# Patient Record
Sex: MALE | Race: ASIAN | NOT HISPANIC OR LATINO | Employment: FULL TIME | URBAN - METROPOLITAN AREA
[De-identification: names, ages, dates, MRNs, and addresses within clinical notes are randomized per-mention and may not be internally consistent; named-entity substitution may affect disease eponyms.]

---

## 2017-05-01 ENCOUNTER — OFFICE VISIT (OUTPATIENT)
Dept: FAMILY MEDICINE | Facility: CLINIC | Age: 27
End: 2017-05-01

## 2017-05-01 VITALS
BODY MASS INDEX: 29.55 KG/M2 | HEIGHT: 68 IN | TEMPERATURE: 98.2 F | RESPIRATION RATE: 16 BRPM | SYSTOLIC BLOOD PRESSURE: 138 MMHG | OXYGEN SATURATION: 98 % | HEART RATE: 89 BPM | WEIGHT: 195 LBS | DIASTOLIC BLOOD PRESSURE: 87 MMHG

## 2017-05-01 DIAGNOSIS — J06.9 VIRAL URI WITH COUGH: ICD-10-CM

## 2017-05-01 DIAGNOSIS — J45.30 REACTIVE AIRWAY DISEASE WITH WHEEZING, MILD PERSISTENT, UNCOMPLICATED: Primary | ICD-10-CM

## 2017-05-01 RX ORDER — ALBUTEROL SULFATE 90 UG/1
2 AEROSOL, METERED RESPIRATORY (INHALATION) EVERY 4 HOURS PRN
Qty: 1 INHALER | Refills: 3 | Status: SHIPPED | OUTPATIENT
Start: 2017-05-01 | End: 2017-09-18

## 2017-05-01 RX ORDER — FLUTICASONE PROPIONATE 110 UG/1
1-2 AEROSOL, METERED RESPIRATORY (INHALATION) 2 TIMES DAILY
Qty: 3 INHALER | Refills: 3 | Status: SHIPPED | OUTPATIENT
Start: 2017-05-01 | End: 2018-05-22

## 2017-05-01 NOTE — PATIENT INSTRUCTIONS
Flovent - inhale 1-2 puffs twice a day.     Albuterol inhaler - use when needed. No more than twice a week.      Your medication list is printed, please keep this with you, it is helpful to bring this current list to any other medical appointments, the emergency room or hospital.    If you had lab testing today and your results are reassuring or normal they will be be mailed to you within 7 days.     If the lab tests need quick action we will call you with the results.   The phone number we will call with results is # [phone] (home) . If this is not the best number please call our clinic and change the number.    If you need any refills please call your pharmacy and they will contact us.    If you have any further concerns or wish to schedule another appointment you must call our office during normal business hours  [phone] (8-5:00 M-F)  If you have urgent medical questions that cannot wait  you may also call [phone] at any time of day.  If you have a medical emergency please call [phone].    Thank you for coming to [facility].

## 2017-05-01 NOTE — MR AVS SNAPSHOT
After Visit Summary   5/1/2017    Sukhdev Price    MRN: [id number]           Patient Information     Date Of Birth          1990        Visit Information        Provider Department      5/1/2017 3:00 PM Fei Downing MD [facility]        Today's Diagnoses     Reactive airway disease with wheezing, mild persistent, uncomplicated    -  1    Viral URI with cough          Care Instructions    Flovent - inhale 1-2 puffs twice a day.     Albuterol inhaler - use when needed. No more than twice a week.      Your medication list is printed, please keep this with you, it is helpful to bring this current list to any other medical appointments, the emergency room or hospital.    If you had lab testing today and your results are reassuring or normal they will be be mailed to you within 7 days.     If the lab tests need quick action we will call you with the results.   The phone number we will call with results is # [phone] (home) . If this is not the best number please call our clinic and change the number.    If you need any refills please call your pharmacy and they will contact us.    If you have any further concerns or wish to schedule another appointment you must call our office during normal business hours  [phone] (8-5:00 M-F)  If you have urgent medical questions that cannot wait  you may also call [phone] at any time of day.  If you have a medical emergency please call [phone].    Thank you for coming to [facility].          Follow-ups after your visit        Who to contact     Please call your clinic at [phone] to:    Ask questions about your health    Make or cancel appointments    Discuss your medicines    Learn about your test results    Speak to your doctor   If you have compliments or concerns about an experience at your clinic, or if you wish to file a complaint, please contact [facility] Patient Relations at [phone] or email us  "at [email or web address]         Additional Information About Your Visit        [employer] [facility] Information     Tapulous is an electronic gateway that provides easy, online access to your medical records. With Tapulous, you can request a clinic appointment, read your test results, renew a prescription or communicate with your care team.     To sign up for Tapulous visit the website at [email or web address]   You will be asked to enter the access code listed below, as well as some personal information. Please follow the directions to create your username and password.     Your access code is: [email or web address]  Expires: 2017  3:20 PM     Your access code will  in 90 days. If you need help or a new code, please contact your [facility] or call [phone] for assistance.        Care EveryWhere ID     This is your Care EveryWhere ID. This could be used by other organizations to access your [place] medical records  [id number]        Your Vitals Were     Pulse Temperature Respirations Height Pulse Oximetry BMI (Body Mass Index)    89 98.2  F (36.8  C) (Oral) 16 5' 7.5\" (171.5 cm) 98% 30.09 kg/m2       Blood Pressure from Last 3 Encounters:   17 138/87   10/03/16 139/83   01/22/15 131/78    Weight from Last 3 Encounters:   17 195 lb (88.5 kg)   10/03/16 189 lb 6.4 oz (85.9 kg)   01/22/15 185 lb 12.8 oz (84.3 kg)              Today, you had the following     No orders found for display         Today's Medication Changes          These changes are accurate as of: 17  3:20 PM.  If you have any questions, ask your nurse or doctor.               Start taking these medicines.        Dose/Directions    fluticasone 110 MCG/ACT Inhaler   Commonly known as:  FLOVENT HFA   Used for:  Reactive airway disease with wheezing, mild persistent, uncomplicated   Started by:  Fei Downing MD        Dose:  1-2 puff   Inhale 1-2 puffs into the lungs 2 times daily   Quantity: "  3 Inhaler   Refills:  3         Stop taking these medicines if you haven't already. Please contact your care team if you have questions.     benzonatate 200 MG capsule   Commonly known as:  TESSALON   Stopped by:  Fei Downing MD           guaiFENesin-codeine 100-10 MG/5ML Soln solution   Commonly known as:  ROBITUSSIN AC   Stopped by:  Fei Downing MD                Where to get your medicines      These medications were sent to [id number] - [place], [place] - [address] AT [address]  [address], [place] [address]     Phone:  [phone]     albuterol 108 (90 BASE) MCG/ACT Inhaler    fluticasone 110 MCG/ACT Inhaler                Primary Care Provider Office Phone # Fax #    Lelia Cesia Andres [phone]       [facility] [address] [place] [address]        Thank you!     Thank you for choosing [facility]  for your care. Our goal is always to provide you with excellent care. Hearing back from our patients is one way we can continue to improve our services. Please take a few minutes to complete the written survey that you may receive in the mail after your visit with us. Thank you!             Your Updated Medication List - Protect others around you: Learn how to safely use, store and throw away your medicines at www.disposemymeds.org.          This list is accurate as of: 5/1/17  3:20 PM.  Always use your most recent med list.                   Brand Name Dispense Instructions for use    albuterol 108 (90 BASE) MCG/ACT Inhaler    PROAIR HFA/PROVENTIL HFA/VENTOLIN HFA    1 Inhaler    Inhale 2 puffs into the lungs every 4 hours as needed for shortness of breath / dyspnea or wheezing       fluticasone 110 MCG/ACT Inhaler    FLOVENT HFA    3 Inhaler    Inhale 1-2 puffs into the lungs 2 times daily

## 2017-05-01 NOTE — PROGRESS NOTES
"       HPI:       Sukhdev Price is a 27 year old  male who presents for wheezing.  Patient was seen last year for wheezing at night. Also notes itchy eyes, and some eye irritation. No sore throat. No snoring. Wakes him up at night, and also wakes up his wife.  He was begun last year on albuterol that helped a lot, but he ran out.  No choking. No evidence for reflux. Patient's wife believesthis may be due to a miek headboard.  No known allergies. Does not ever bother him during the day. Able to exercise without difficulty, and often goes to the Gym.  No onset of problems when running. No wheezing at any other time.  Does admit to an allergy of dogs.             PMHX:   Current Medications:   Current Outpatient Prescriptions   Medication Sig Dispense Refill     albuterol (PROAIR HFA, PROVENTIL HFA, VENTOLIN HFA) 108 (90 BASE) MCG/ACT inhaler Inhale 2 puffs into the lungs every 4 hours as needed for shortness of breath / dyspnea or wheezing 1 Inhaler 0       Existing Problems  Patient Active Problem List   Diagnosis     Viral URI with cough       Allergies:  Allergies   Allergen Reactions     Nka [No Known Allergies]        Previous labs:  No results found for: WBC, HGB, HCT, PLT, CHOL, TRIG, HDL, ALT, AST, NA, CREATININE, BUN, CO2, TSH, PSA, INR, GLUF            Review of Systems:    CONSTITUTIONAL: no fatigue, no unexpected change in weight  SKIN: no worrisome rashes, no worrisome moles, no worrisome lesions  EYES: no acute vision problems or changes  ENT: no ear problems, no mouth problems, no throat problems  RESP: no significant cough, no shortness of breath  CV: no chest pain, no palpitations, no new or worsening peripheral edema  GI: no nausea, no vomiting, no constipation, no diarrhea          Physical Exam:     Vitals:    05/01/17 1458   BP: 138/87   Pulse: 89   Resp: 16   Temp: 98.2  F (36.8  C)   TempSrc: Oral   SpO2: 98%   Weight: 195 lb (88.5 kg)   Height: 5' 7.5\" (171.5 cm)     Body mass index is 30.09 " kg/(m^2).    GENERAL:alert, well hydrated, no distress  EYES: Eyes grossly normal to inspection, extraocular movements - intact, and PERRL  HENT: ear canals- normal; TMs- normal; Nose- normal; Mouth- no ulcers, no lesions  NECK: no tenderness, no adenopathy, no asymmetry, no masses, no stiffness; thyroid- normal to palpation  RESP: lungs clear to auscultation - no rales, no rhonchi, no wheezes  CV: regular rates and rhythm, normal S1 S2, no S3 or S4 and no murmur, no click or rub -  ABDOMEN: soft, no tenderness, no  hepatosplenomegaly, no masses, normal bowel sounds             Labs and Procedures     Historic Results on 03/30/2011   Component Date Value Ref Range Status     [place] Results 03/30/2011 SEE BELOW   Final    Comment:                                                                          HI          Expected                           Test                           Result        LO  Units   Values                           -----------------------------------------------------------------                           Semen Analysis with WHO Morphology                             Abstinence                   2                 d                                                                        Collection Site              Outside                                                                                    Study Type                   Semen                                                                                      Container Type               50 mL [place]                                                                               Appearance                   YEL                                                                                        Semen Volume                 4.5               mL      >=1.5                                                            pH                           8.5                       >=7.2                                                             Motile/mL                    23.5              x10(6)  >=6.0                                                            Sperm/mL                     43.5              x10(6)  >=15.0                                                           Motility                     54                %       >=40                                                             Grade                        3                         >=2.5                                                            Motile/Ejaculate             105.8             x10(6)  >=9.0                                                            Viscosity                    4                         >=3.0                                                            Agglutination                4                         >=3.0                                                            Comment                                                                                       Specimen appearance, volume, pH and viscosity were observations by                               referring personnel.                                                         WHO Morph NL                 25                %       >=15                                                             Other                        75                %                                                                        Germ Cells/mL                0.00              (x10)6  <4.00                                                            WBC/mL                       0.44              (x10)6  <1.00                                                          Test performed or referred by [facility]                           [address] [place] [address]                           : Dallas Pickens III, M.D.     Slides, Sperm Ct 03/30/2011 NO   Final              Assessment and Plan     1.Suspect reactive airway disease - probably secondary to environmental allergy.   No evidence for asthma at this time.    2. Fluticasone inhaler - bid, but may just take at night if that is when he has problems.    3. Albuterol as rescue medication.    4. Discussed environmental issues, and need to avoid agents likely to cause problems.   5. Return if problems persist or further problems develops.         Options for treatment and follow-up care were reviewed with the patient and/or guardian. Xai Dee and/or guardian engaged in the decision making process and verbalized understanding of the options discussed and agreed with the final plan.    Fei Downing MD

## 2017-06-16 ENCOUNTER — OFFICE VISIT (OUTPATIENT)
Dept: FAMILY MEDICINE | Facility: CLINIC | Age: 27
End: 2017-06-16

## 2017-06-16 VITALS
RESPIRATION RATE: 16 BRPM | BODY MASS INDEX: 29.04 KG/M2 | OXYGEN SATURATION: 97 % | DIASTOLIC BLOOD PRESSURE: 80 MMHG | HEIGHT: 68 IN | HEART RATE: 92 BPM | SYSTOLIC BLOOD PRESSURE: 131 MMHG | TEMPERATURE: 98.1 F | WEIGHT: 191.6 LBS

## 2017-06-16 DIAGNOSIS — S93.412A SPRAIN OF CALCANEOFIBULAR LIGAMENT OF LEFT ANKLE, INITIAL ENCOUNTER: Primary | ICD-10-CM

## 2017-06-16 NOTE — LETTER
RETURN TO WORK/SCHOOL FORM    6/16/2017    Re: Sukhdev Price  1990      To Whom It May Concern:     Sukhdev Price was seen in clinic today. He may return to work without restrictions on Monday 6/19/17.          Restrictions:  None      Ria Castellanos DO  6/16/2017 3:51 PM

## 2017-06-16 NOTE — MR AVS SNAPSHOT
After Visit Summary   6/16/2017    Sukhdev Price    MRN: [id number]           Patient Information     Date Of Birth          1990        Visit Information        Provider Department      6/16/2017 3:40 PM Ria Castellanos DO [facility]        Today's Diagnoses     Sprain of left ankle, unspecified ligament, initial encounter    -  1      Care Instructions    Continue to take Tylenol and ibuprofen for pain.    Icing and elevating your left foot will help with the swelling.    Ace wrap your ankle if you are going to be on your foot (Mid foot to slightly above your ankle.    Swelling will be there for a couple more weeks, but should be getting better.        Ankle Sprain (Adult)  An ankle sprain is a stretching or tearing of the ligaments that hold the ankle joint together. There are no broken bones.  An ankle sprain is a common injury for both children and adults. It happens when the ankle turns, twists, or rolls in an awkward way. This can be caused by a sports injury. Or it can happen from doing something as simple as stepping on an uneven surface.  Ligaments are made of tough connective tissue. Normally, ligaments stretch a certain amount and then go back to their normal place. A sprain happens when a ligament is forced to stretch more than the normal amount. A severe sprain can actually tear the ligaments. If you have a severe sprain, you may have felt or heard something like a pop when you were injured.  Ankle sprains are given a grade depending on whether they are mild, moderate, or severe:    Grade 1 sprain. A mild sprain with minor stretching and damage to the ligament.    Grade 2 sprain. A moderate sprain where the ligament is partly torn.    Grade 3 sprain. The most severe kind of sprain. The ligament is completely torn.  Most sprains take about 4 to 6 weeks to heal. A severe sprain can take several months to recover.  Your healthcare provider may order X-rays to be sure you don t have  a fracture, or broken bone.  The injured area will feel sore.  Swelling and pain may make it hard to walk. You may need crutches if walking is painful. Or your provider may have you use a cast boot or air splint. This will depend on the grade of ankle sprain that you have.    Home care    For a Grade 1 sprain, use RICE (rest, ice, compression, and elevation):    Rest your ankle. Don t walk on it.    Ice should be used right away to help control swelling. Place an ice pack over the injured area for 20 minutes. Do this every 3 to 6 hours for the first 24 to 48 hours. Keep using ice packs to ease pain and swelling as needed. To make an ice pack, put ice cubes in a plastic bag that seals at the top. Wrap the bag in a clean, thin towel or cloth. Never put ice or an ice pack directly on the skin. The ice pack can be put right on the cast, bandage, or splint. As the ice melts, be careful that the cast, bandage, or splint doesn t get wet. If you have a boot, open it to apply an ice pack, unless told otherwise by your provider.    Compression devices help to control swelling. They also keep the ankle from moving and support your injured ankle. These devices include dressings, bandages, and wraps.    Elevate or raise your ankle above the level of your heart when sitting or lying down. This is very important for the first 48 hours.    Follow the RICE guidelines for a Grade 2 sprain. This type of sprain will take longer to heal. Your provider may have you wear a splint, cast, or brace to keep your ankle from moving.     If you have a Grade 3 sprain, you are at risk for long-term ankle instability. In rare cases, surgery may be needed. Your provider may have you wear a short leg cast or a walking boot for 2 to 3 weeks.    After 48 hours, it may be helpful to apply heat for 20 minutes several times a day. You can do this with a heating pad or warm compress. Or you may want to go back and forth between using ice and heat. Never  apply heat directly to the skin. Always wrap the heating pad or warm compress in a clean, thin towel or cloth.    You may use over-the-counter pain medicine (NSAIDS or nonsteroidal anti-inflammatory drugs) to control pain, unless another pain medicine was prescribed. Talk with your provider before using these medicines if you have chronic liver or kidney disease, or have ever had a stomach ulcer or GI (gastrointestinal) bleeding.    Follow any rehabilitation exercises your provider gives you. These can help you be more flexible and improve your balance and coordination. This is helpful in preventing long-term ankle problems.  Prevention  To help prevent ankle sprains, it s important to have good strength, balance, and flexibility. Be sure to:    Always warm up before you exercise or do something very active    Be careful when walking or running on uneven or cracked surfaces    Wear shoes that are in good condition and fit well    Listen to your body s signals to slow down when you are in pain or tired  Follow-up care  Any X-rays you had today don t show any broken bones, breaks, or fractures. Sometimes fractures don t show up on the first X-ray. Bruises and sprains can sometimes hurt as much as a fracture. These injuries can take time to heal completely. If your symptoms don t get better or they get worse, talk with your healthcare provider. You may need a repeat X-ray.  Follow up with your healthcare provider, or as advised. Check for any warning signs listed below.  When to seek medical advice  Call your healthcare provider right away if any of these occur:    Fever of 100.4 F (38 C) or higher, or as directed by your healthcare provider    The injury doesn t seem to be healing    The swelling comes back    The cast has a bad smell    The plaster cast or splint gets wet or soft    The fiberglass cast or splint gets wet and does not dry for 24 hours    The pain or swelling increases, or redness appears    Your toes  become cold, blue, numb, or tingly    The skin is discolored (looks blue, purple, or gray), has blisters, or is irritated    You re-injure your ankle  Date Last Reviewed: 11/20/2015 2000-2017 [employer]. [address] [place] [address]. All rights reserved. This information is not intended as a substitute for professional medical care. Always follow your healthcare professional's instructions.          Your medication list is printed, please keep this with you, it is helpful to bring this current list to any other medical appointments, the emergency room or hospital.    If you had lab testing today and your results are reassuring or normal they will be be mailed to you within 7 days.     If the lab tests need quick action we will call you with the results.   The phone number we will call with results is # [phone] (home) . If this is not the best number please call our clinic and change the number.    If you need any refills please call your pharmacy and they will contact us.    If you have any further concerns or wish to schedule another appointment you must call our office during normal business hours  [phone] (8-5:00 M-F)  If you have urgent medical questions that cannot wait  you may also call [phone] at any time of day.  If you have a medical emergency please call [phone].    Thank you for coming to [facility].            Follow-ups after your visit        Who to contact     Please call your clinic at [phone] to:    Ask questions about your health    Make or cancel appointments    Discuss your medicines    Learn about your test results    Speak to your doctor   If you have compliments or concerns about an experience at your clinic, or if you wish to file a complaint, please contact [facility] Relations at [phone] or email us at [email or web address]         Additional Information About Your Visit       "  MyChart Information     [employer] is an electronic gateway that provides easy, online access to your medical records. With [employer], you can request a clinic appointment, read your test results, renew a prescription or communicate with your care team.     To sign up for [employer] visit the website at [email or web address]   You will be asked to enter the access code listed below, as well as some personal information. Please follow the directions to create your username and password.     Your access code is: [email or web address]  Expires: 2017  3:20 PM     Your access code will  in 90 days. If you need help or a new code, please contact your [facility] or call [phone] for assistance.        Care EveryWhere ID     This is your Care EveryWhere ID. This could be used by other organizations to access your [facility] medical records  [id number]        Your Vitals Were     Pulse Temperature Respirations Height Pulse Oximetry BMI (Body Mass Index)    92 98.1  F (36.7  C) (Oral) 16 5' 7.5\" (171.5 cm) 97% 29.57 kg/m2       Blood Pressure from Last 3 Encounters:   17 131/80   17 138/87   10/03/16 139/83    Weight from Last 3 Encounters:   17 191 lb 9.6 oz (86.9 kg)   17 195 lb (88.5 kg)   10/03/16 189 lb 6.4 oz (85.9 kg)              Today, you had the following     No orders found for display       Primary Care Provider Office Phone # Fax #    Lelia Andres [phone]       [facility] [address] [facility] [address]        Thank you!     Thank you for choosing [facility]  for your care. Our goal is always to provide you with excellent care. Hearing back from our patients is one way we can continue to improve our services. Please take a few minutes to complete the written survey that you may receive in the mail after your visit with us. Thank you!             Your Updated Medication List - Protect others around " you: Learn how to safely use, store and throw away your medicines at www.disposemymeds.org.          This list is accurate as of: 6/16/17  3:53 PM.  Always use your most recent med list.                   Brand Name Dispense Instructions for use    albuterol 108 (90 BASE) MCG/ACT Inhaler    PROAIR HFA/PROVENTIL HFA/VENTOLIN HFA    1 Inhaler    Inhale 2 puffs into the lungs every 4 hours as needed for shortness of breath / dyspnea or wheezing       fluticasone 110 MCG/ACT Inhaler    FLOVENT HFA    3 Inhaler    Inhale 1-2 puffs into the lungs 2 times daily

## 2017-06-16 NOTE — PROGRESS NOTES
"  Cc: left ankle pain         HPI:       Sukhdev Price is a 27 year old  male without a significant past medical history who presents for the new concern(s) of    1. Left ankle pain:  Injury to left ankle, jumped up and fell on person, foot turned inward, had immediate pain, heard a crack, has been walking on it, has been icing, tylenol, has injured that ankle before, he is a  and walks about 2 miles per day         PMHX:     Patient Active Problem List   Diagnosis     Viral URI with cough       Current Outpatient Prescriptions   Medication Sig Dispense Refill     albuterol (PROAIR HFA/PROVENTIL HFA/VENTOLIN HFA) 108 (90 BASE) MCG/ACT Inhaler Inhale 2 puffs into the lungs every 4 hours as needed for shortness of breath / dyspnea or wheezing 1 Inhaler 3     fluticasone (FLOVENT HFA) 110 MCG/ACT Inhaler Inhale 1-2 puffs into the lungs 2 times daily 3 Inhaler 3              Allergies   Allergen Reactions     Nka [No Known Allergies]        No results found for this or any previous visit (from the past 24 hour(s)).           Physical Exam:     Vitals:    06/16/17 1537   BP: 131/80   Pulse: 92   Resp: 16   Temp: 98.1  F (36.7  C)   TempSrc: Oral   SpO2: 97%   Weight: 191 lb 9.6 oz (86.9 kg)   Height: 5' 7.5\" (171.5 cm)     Body mass index is 29.57 kg/(m^2).    GENERAL APPEARANCE: healthy, alert and no distress,  MS: Left ankle, swollen surrounding the left malleolus , minimal bruising, good range of motion, nontender to palpation at malleolus  Walking stiffed knee and placing all weight on foot    Assessment and Plan     1. Sprain of calcaneofibular ligament of left ankle, initial encounter  - Discussed etiology of a sprain, ice, elevate, rest, use ACE wrap when going to be active  - Letter for work, he is , OK to return to work on Monday, but will need Ibuprofen and ice        Options for treatment and follow-up care were reviewed with the patient and/or guardian. Sukhdev Price and/or guardian engaged in " the decision making process and verbalized understanding of the options discussed and agreed with the final plan.    Ria Castellanos, DO

## 2017-06-16 NOTE — PATIENT INSTRUCTIONS
Continue to take Tylenol and ibuprofen for pain.    Icing and elevating your left foot will help with the swelling.    Ace wrap your ankle if you are going to be on your foot (Mid foot to slightly above your ankle.    Swelling will be there for a couple more weeks, but should be getting better.        Ankle Sprain (Adult)  An ankle sprain is a stretching or tearing of the ligaments that hold the ankle joint together. There are no broken bones.  An ankle sprain is a common injury for both children and adults. It happens when the ankle turns, twists, or rolls in an awkward way. This can be caused by a sports injury. Or it can happen from doing something as simple as stepping on an uneven surface.  Ligaments are made of tough connective tissue. Normally, ligaments stretch a certain amount and then go back to their normal place. A sprain happens when a ligament is forced to stretch more than the normal amount. A severe sprain can actually tear the ligaments. If you have a severe sprain, you may have felt or heard something like a pop when you were injured.  Ankle sprains are given a grade depending on whether they are mild, moderate, or severe:    Grade 1 sprain. A mild sprain with minor stretching and damage to the ligament.    Grade 2 sprain. A moderate sprain where the ligament is partly torn.    Grade 3 sprain. The most severe kind of sprain. The ligament is completely torn.  Most sprains take about 4 to 6 weeks to heal. A severe sprain can take several months to recover.  Your healthcare provider may order X-rays to be sure you don t have a fracture, or broken bone.  The injured area will feel sore.  Swelling and pain may make it hard to walk. You may need crutches if walking is painful. Or your provider may have you use a cast boot or air splint. This will depend on the grade of ankle sprain that you have.    Home care    For a Grade 1 sprain, use RICE (rest, ice, compression, and elevation):    Rest your ankle.  Don t walk on it.    Ice should be used right away to help control swelling. Place an ice pack over the injured area for 20 minutes. Do this every 3 to 6 hours for the first 24 to 48 hours. Keep using ice packs to ease pain and swelling as needed. To make an ice pack, put ice cubes in a plastic bag that seals at the top. Wrap the bag in a clean, thin towel or cloth. Never put ice or an ice pack directly on the skin. The ice pack can be put right on the cast, bandage, or splint. As the ice melts, be careful that the cast, bandage, or splint doesn t get wet. If you have a boot, open it to apply an ice pack, unless told otherwise by your provider.    Compression devices help to control swelling. They also keep the ankle from moving and support your injured ankle. These devices include dressings, bandages, and wraps.    Elevate or raise your ankle above the level of your heart when sitting or lying down. This is very important for the first 48 hours.    Follow the RICE guidelines for a Grade 2 sprain. This type of sprain will take longer to heal. Your provider may have you wear a splint, cast, or brace to keep your ankle from moving.     If you have a Grade 3 sprain, you are at risk for long-term ankle instability. In rare cases, surgery may be needed. Your provider may have you wear a short leg cast or a walking boot for 2 to 3 weeks.    After 48 hours, it may be helpful to apply heat for 20 minutes several times a day. You can do this with a heating pad or warm compress. Or you may want to go back and forth between using ice and heat. Never apply heat directly to the skin. Always wrap the heating pad or warm compress in a clean, thin towel or cloth.    You may use over-the-counter pain medicine (NSAIDS or nonsteroidal anti-inflammatory drugs) to control pain, unless another pain medicine was prescribed. Talk with your provider before using these medicines if you have chronic liver or kidney disease, or have ever had a  stomach ulcer or GI (gastrointestinal) bleeding.    Follow any rehabilitation exercises your provider gives you. These can help you be more flexible and improve your balance and coordination. This is helpful in preventing long-term ankle problems.  Prevention  To help prevent ankle sprains, it s important to have good strength, balance, and flexibility. Be sure to:    Always warm up before you exercise or do something very active    Be careful when walking or running on uneven or cracked surfaces    Wear shoes that are in good condition and fit well    Listen to your body s signals to slow down when you are in pain or tired  Follow-up care  Any X-rays you had today don t show any broken bones, breaks, or fractures. Sometimes fractures don t show up on the first X-ray. Bruises and sprains can sometimes hurt as much as a fracture. These injuries can take time to heal completely. If your symptoms don t get better or they get worse, talk with your healthcare provider. You may need a repeat X-ray.  Follow up with your healthcare provider, or as advised. Check for any warning signs listed below.  When to seek medical advice  Call your healthcare provider right away if any of these occur:    Fever of 100.4 F (38 C) or higher, or as directed by your healthcare provider    The injury doesn t seem to be healing    The swelling comes back    The cast has a bad smell    The plaster cast or splint gets wet or soft    The fiberglass cast or splint gets wet and does not dry for 24 hours    The pain or swelling increases, or redness appears    Your toes become cold, blue, numb, or tingly    The skin is discolored (looks blue, purple, or gray), has blisters, or is irritated    You re-injure your ankle  Date Last Reviewed: 11/20/2015 2000-2017 [employer]. [address] [place] [address], [place] [address]. All rights reserved. This information is not intended as a substitute for professional medical care. Always follow your  healthcare professional's instructions.          Your medication list is printed, please keep this with you, it is helpful to bring this current list to any other medical appointments, the emergency room or hospital.    If you had lab testing today and your results are reassuring or normal they will be be mailed to you within 7 days.     If the lab tests need quick action we will call you with the results.   The phone number we will call with results is # [phone] (home) . If this is not the best number please call our clinic and change the number.    If you need any refills please call your pharmacy and they will contact us.    If you have any further concerns or wish to schedule another appointment you must call our office during normal business hours  [phone] (8-5:00 M-F)  If you have urgent medical questions that cannot wait  you may also call [phone] at any time of day.  If you have a medical emergency please call [phone].    Thank you for coming to [facility].

## 2017-06-28 ENCOUNTER — RECORDS - HEALTHEAST (OUTPATIENT)
Dept: ADMINISTRATIVE | Facility: OTHER | Age: 27
End: 2017-06-28

## 2017-06-28 ENCOUNTER — AMBULATORY - HEALTHEAST (OUTPATIENT)
Dept: LAB | Facility: HOSPITAL | Age: 27
End: 2017-06-28

## 2017-06-28 DIAGNOSIS — Z31.441 ENCOUNTER FOR TESTING OF MALE PARTNER OF FEMALE WITH RECURRENT PREGNANCY LOSS: ICD-10-CM

## 2017-07-10 LAB — SPECIMEN STATUS: NORMAL

## 2017-09-18 DIAGNOSIS — J06.9 VIRAL URI WITH COUGH: ICD-10-CM

## 2017-09-19 RX ORDER — ALBUTEROL SULFATE 90 UG/1
2 AEROSOL, METERED RESPIRATORY (INHALATION) EVERY 4 HOURS PRN
Qty: 1 INHALER | Refills: 11 | Status: SHIPPED | OUTPATIENT
Start: 2017-09-19 | End: 2018-05-22

## 2017-09-25 PROBLEM — J45.30: Status: ACTIVE | Noted: 2017-09-25

## 2018-01-30 ENCOUNTER — TELEPHONE (OUTPATIENT)
Dept: FAMILY MEDICINE | Facility: CLINIC | Age: 28
End: 2018-01-30

## 2018-01-30 NOTE — TELEPHONE ENCOUNTER
Outreach to patient due for asthma f.u, per pt breathing is good, no problem, don't use inhaler much any more.  ACT over the phone 24/25.  He will call back when need to be seen per pt. CXiong, RICHARDSON.

## 2018-01-31 ASSESSMENT — ASTHMA QUESTIONNAIRES: ACT_TOTALSCORE: 24

## 2018-05-22 ENCOUNTER — TRANSFERRED RECORDS (OUTPATIENT)
Dept: HEALTH INFORMATION MANAGEMENT | Facility: CLINIC | Age: 28
End: 2018-05-22

## 2018-05-22 ENCOUNTER — OFFICE VISIT (OUTPATIENT)
Dept: FAMILY MEDICINE | Facility: CLINIC | Age: 28
End: 2018-05-22
Payer: COMMERCIAL

## 2018-05-22 VITALS
TEMPERATURE: 97.9 F | WEIGHT: 198.2 LBS | DIASTOLIC BLOOD PRESSURE: 74 MMHG | RESPIRATION RATE: 18 BRPM | HEART RATE: 88 BPM | SYSTOLIC BLOOD PRESSURE: 119 MMHG | OXYGEN SATURATION: 97 % | BODY MASS INDEX: 31.11 KG/M2 | HEIGHT: 67 IN

## 2018-05-22 DIAGNOSIS — J06.9 VIRAL URI WITH COUGH: ICD-10-CM

## 2018-05-22 DIAGNOSIS — J30.2 CHRONIC SEASONAL ALLERGIC RHINITIS, UNSPECIFIED TRIGGER: ICD-10-CM

## 2018-05-22 DIAGNOSIS — R06.02 SHORTNESS OF BREATH: Primary | ICD-10-CM

## 2018-05-22 LAB
FEF 25/75: NORMAL
FEV-1: NORMAL
FEV1/FVC: NORMAL
FVC: NORMAL

## 2018-05-22 RX ORDER — ALBUTEROL SULFATE 90 UG/1
2 AEROSOL, METERED RESPIRATORY (INHALATION) EVERY 4 HOURS PRN
Qty: 1 INHALER | Refills: 11 | Status: SHIPPED | OUTPATIENT
Start: 2018-05-22 | End: 2019-12-26

## 2018-05-22 RX ORDER — LORATADINE 10 MG/1
10 TABLET ORAL DAILY
Qty: 90 TABLET | Refills: 3 | Status: SHIPPED | OUTPATIENT
Start: 2018-05-22 | End: 2020-02-03

## 2018-05-22 NOTE — MR AVS SNAPSHOT
After Visit Summary   2018    Sukhdev Price    MRN: [id number]           Patient Information     Date Of Birth          1990        Visit Information        Provider Department      2018 3:20 PM Budd, Jennifer, DO [facility]        Today's Diagnoses     Shortness of breath    -  1    Chronic seasonal allergic rhinitis, unspecified trigger        Viral URI with cough           Follow-ups after your visit        Future tests that were ordered for you today     Open Future Orders        Priority Expected Expires Ordered    Spirometry, Breathing Capacity - interpretation Routine  2018            Who to contact     Please call your clinic at [phone] to:    Ask questions about your health    Make or cancel appointments    Discuss your medicines    Learn about your test results    Speak to your doctor            Additional Information About Your Visit        MyChart Information     [employer] is an electronic gateway that provides easy, online access to your medical records. With [employer], you can request a clinic appointment, read your test results, renew a prescription or communicate with your care team.     To sign up for [employer] visit the website at [email or web address]   You will be asked to enter the access code listed below, as well as some personal information. Please follow the directions to create your username and password.     Your access code is: [email or web address]  Expires: 2018  3:29 PM     Your access code will  in 90 days. If you need help or a new code, please contact your [facility] or call [phone] for assistance.        Care EveryWhere ID     This is your Care EveryWhere ID. This could be used by other organizations to access your [place] medical records  [id number]        Your Vitals Were     Pulse Temperature Respirations Height Pulse Oximetry BMI (Body Mass Index)    88 97.9  F (36.6  C) (Oral) 18 5'  "7.13\" (170.5 cm) 97% 30.93 kg/m2       Blood Pressure from Last 3 Encounters:   05/22/18 119/74   06/16/17 131/80   05/01/17 138/87    Weight from Last 3 Encounters:   05/22/18 198 lb 3.2 oz (89.9 kg)   06/16/17 191 lb 9.6 oz (86.9 kg)   05/01/17 195 lb (88.5 kg)              We Performed the Following     XR CHEST 2 VW          Today's Medication Changes          These changes are accurate as of 5/22/18  4:30 PM.  If you have any questions, ask your nurse or doctor.               Start taking these medicines.        Dose/Directions    loratadine 10 MG tablet   Commonly known as:  CLARITIN   Used for:  Chronic seasonal allergic rhinitis, unspecified trigger   Started by:  Ria Castellanos DO        Dose:  10 mg   Take 1 tablet (10 mg) by mouth daily   Quantity:  90 tablet   Refills:  3            Where to get your medicines      These medications were sent to [id number] - [place], [place] - [address] AT [address], [place] [address]     Phone:  [phone]     albuterol 108 (90 Base) MCG/ACT Inhaler    loratadine 10 MG tablet                Primary Care Provider Office Phone # Fax #    Heather Alvarado [phone]       [facility] [address] [place] [address]        Equal Access to Services     [facility] AH: Hadii errol lyncho Sosydney, waaxda luqadaha, qaybta kaalmada adeegyada, warren roth. So [facility] [phone].    ATENCIÓN: Si habla español, tiene a rosenberg disposición servicios gratuitos de asistencia lingüística. Hellen al [phone].    We comply with applicable federal civil rights laws and [place] laws. We do not discriminate on the basis of race, color, national origin, age, disability, sex, sexual orientation, or gender identity.            Thank you!     Thank you for choosing [facility]  for your care. Our goal is always to provide you with excellent care. " Hearing back from our patients is one way we can continue to improve our services. Please take a few minutes to complete the written survey that you may receive in the mail after your visit with us. Thank you!             Your Updated Medication List - Protect others around you: Learn how to safely use, store and throw away your medicines at www.disposemymeds.org.          This list is accurate as of 5/22/18  4:30 PM.  Always use your most recent med list.                   Brand Name Dispense Instructions for use Diagnosis    albuterol 108 (90 Base) MCG/ACT Inhaler    PROAIR HFA/PROVENTIL HFA/VENTOLIN HFA    1 Inhaler    Inhale 2 puffs into the lungs every 4 hours as needed for shortness of breath / dyspnea or wheezing    Viral URI with cough       loratadine 10 MG tablet    CLARITIN    90 tablet    Take 1 tablet (10 mg) by mouth daily    Chronic seasonal allergic rhinitis, unspecified trigger

## 2018-05-22 NOTE — NURSING NOTE
ACT--given to pt to fill out for MD  AAP--inform MD to address  HIV screening--offer but pt decline

## 2018-05-22 NOTE — PROGRESS NOTES
HPI:       Sukhdev Price is a 28 year old  male without a significant past medical history who presents for follow up of concern(s) listed below    1. Shortness of breath: Was called on 1/30/2018 for asthma follow up and had a ACT of 24/25. He is here to follow up for his presumed reactive airway disease due to allergens. He was diagnosed about 1 year ago, but feels he has improved over the year. He is symptomatic mostly at night when he lays back and sleeping, he wakes up at night and feels like it is hard to breathe, he does not believe he snores. He also notices when he laughs hard. Also when he is around dogs he has a hard time breathing. He stopped taking his flovent and only has been using his albuterol inhaler. Seasonal allergies are a trigger for him.         PMHX:     Patient Active Problem List   Diagnosis     Viral URI with cough     Reactive airway disease with wheezing, mild persistent, uncomplicated       Current Outpatient Prescriptions   Medication Sig Dispense Refill     albuterol (PROAIR HFA/PROVENTIL HFA/VENTOLIN HFA) 108 (90 Base) MCG/ACT Inhaler Inhale 2 puffs into the lungs every 4 hours as needed for shortness of breath / dyspnea or wheezing 1 Inhaler 11     loratadine (CLARITIN) 10 MG tablet Take 1 tablet (10 mg) by mouth daily 90 tablet 3       Social History     Social History     Marital status: Single     Spouse name: N/A     Number of children: N/A     Years of education: N/A     Occupational History     Not on file.     Social History Main Topics     Smoking status: Never Smoker     Smokeless tobacco: Never Used     Alcohol use Yes      Comment: Ocassionally, twice a month     Drug use: No     Sexual activity: Yes     Partners: Female     Other Topics Concern     Not on file     Social History Narrative          Allergies   Allergen Reactions     Nka [No Known Allergies]        No results found for this or any previous visit (from the past 24 hour(s)).         Review of Systems:  "  C: NEGATIVE for fatigue, unexpected change in weight  E: NEGATIVE for acute vision problems or changes  ENT/MOUTH:Itchy throat  CV: NEGATIVE for chest pain, palpitations or new or worsening peripheral edema  P: NEGATIVE for changes in mood or affect          Physical Exam:     Vitals:    05/22/18 1526 05/22/18 1534   BP: 149/76 119/74   Pulse: 89 88   Resp: 18    Temp: 97.9  F (36.6  C)    TempSrc: Oral    SpO2: 97%    Weight: 198 lb 3.2 oz (89.9 kg)    Height: 5' 7.13\" (170.5 cm)      Body mass index is 30.93 kg/(m^2).    GENERAL APPEARANCE: healthy, alert and no distress,  HENT: ear canals and TM's normal and nose and mouth without ulcers or lesions  NECK: no adenopathy, no asymmetry, masses, or scars and thyroid normal to palpation  RESP: lungs clear to auscultation - no rales, rhonchi or wheezes  CV: regular rate and rhythm,  and no murmur, click,  rub or gallop  MS: extremities normal- no gross deformities noted    CXR: no acute process, lungs clear  Spirometry: FEV1: 71% predicted    Assessment and Plan     1. Shortness of breath  Unsure of etiology, does not not sound obstructive and IS was more restrictive   - Spirometry, Breathing Capacity - interpretation; Future  - XR CHEST 2 VW    2. Chronic seasonal allergic rhinitis, unspecified trigger  - loratadine (CLARITIN) 10 MG tablet; Take 1 tablet (10 mg) by mouth daily  Dispense: 90 tablet; Refill: 3    3. Viral URI with cough  - albuterol (PROAIR HFA/PROVENTIL HFA/VENTOLIN HFA) 108 (90 Base) MCG/ACT Inhaler; Inhale 2 puffs into the lungs every 4 hours as needed for shortness of breath / dyspnea or wheezing  Dispense: 1 Inhaler; Refill: 11      Options for treatment and follow-up care were reviewed with the patient and/or guardian. Xai Dee and/or guardian engaged in the decision making process and verbalized understanding of the options discussed and agreed with the final plan.    Ria Castellanos, DO        "

## 2018-05-23 ASSESSMENT — ASTHMA QUESTIONNAIRES: ACT_TOTALSCORE: 21

## 2018-06-05 DIAGNOSIS — R06.02 SHORTNESS OF BREATH: ICD-10-CM

## 2018-09-26 ENCOUNTER — DOCUMENTATION ONLY (OUTPATIENT)
Dept: FAMILY MEDICINE | Facility: CLINIC | Age: 28
End: 2018-09-26

## 2018-09-27 ASSESSMENT — ASTHMA QUESTIONNAIRES: ACT_TOTALSCORE: 20

## 2019-12-26 ENCOUNTER — TELEPHONE (OUTPATIENT)
Dept: FAMILY MEDICINE | Facility: CLINIC | Age: 29
End: 2019-12-26

## 2019-12-26 DIAGNOSIS — J06.9 VIRAL URI WITH COUGH: ICD-10-CM

## 2019-12-26 RX ORDER — ALBUTEROL SULFATE 90 UG/1
2 AEROSOL, METERED RESPIRATORY (INHALATION) EVERY 4 HOURS PRN
Qty: 1 INHALER | Refills: 0 | Status: SHIPPED | OUTPATIENT
Start: 2019-12-26 | End: 2020-02-03

## 2019-12-26 NOTE — TELEPHONE ENCOUNTER
[facility] phone call message- patient requesting a refill:    Full Medication Name: albuterol (PROAIR HFA/PROVENTIL HFA/VENTOLIN HFA) 108 (90 Base) MCG/ACT Inhaler    Dose:     Pharmacy confirmed as   [id number] - [place], [place] - [address] AT [address]  [address]  [place] [address]  Phone: [phone] Fax: [phone]  : Yes    Additional Comments: Patient is requesting a refill for the medication listed above. Notified if able to schedule appointment but patient declined. Please call and advise.      OK to leave a message on voice mail? Yes    Primary language: English      needed? No    Call taken on December 26, 2019 at 9:00 AM by Uzma Redd

## 2019-12-26 NOTE — TELEPHONE ENCOUNTER
[facility] phone call message- medication clarification/question:    Full Medication Name: fluticasone (FLOVENT HFA) 110 MCG/ACT Inhaler       Question: Patient is requesting this medication be sent to his pharmacy. Notified if able to schedule an appointment but patient declined. Please call and advise.      Pharmacy confirmed as [id number] - [place], [place] - [address]: Yes    OK to leave a message on voice mail? Yes    Primary language: English      needed? No    Call taken on December 26, 2019 at 9:02 AM by Uzma Redd

## 2020-02-03 ENCOUNTER — OFFICE VISIT (OUTPATIENT)
Dept: FAMILY MEDICINE | Facility: CLINIC | Age: 30
End: 2020-02-03
Payer: COMMERCIAL

## 2020-02-03 VITALS
HEIGHT: 68 IN | WEIGHT: 209.2 LBS | HEART RATE: 74 BPM | DIASTOLIC BLOOD PRESSURE: 77 MMHG | TEMPERATURE: 98.2 F | SYSTOLIC BLOOD PRESSURE: 124 MMHG | OXYGEN SATURATION: 96 % | BODY MASS INDEX: 31.71 KG/M2 | RESPIRATION RATE: 18 BRPM

## 2020-02-03 DIAGNOSIS — J45.30 REACTIVE AIRWAY DISEASE WITH WHEEZING, MILD PERSISTENT, UNCOMPLICATED: Primary | ICD-10-CM

## 2020-02-03 DIAGNOSIS — J06.9 VIRAL URI WITH COUGH: ICD-10-CM

## 2020-02-03 RX ORDER — FLUTICASONE PROPIONATE 110 UG/1
2 AEROSOL, METERED RESPIRATORY (INHALATION) 2 TIMES DAILY
Qty: 12 G | Refills: 1 | Status: SHIPPED | OUTPATIENT
Start: 2020-02-03 | End: 2020-07-20

## 2020-02-03 RX ORDER — ALBUTEROL SULFATE 90 UG/1
2 AEROSOL, METERED RESPIRATORY (INHALATION) EVERY 4 HOURS PRN
Qty: 1 INHALER | Refills: 0 | Status: SHIPPED | OUTPATIENT
Start: 2020-02-03 | End: 2020-07-16

## 2020-02-03 ASSESSMENT — ASTHMA QUESTIONNAIRES
ACT_TOTALSCORE: 15
QUESTION_2 LAST FOUR WEEKS HOW OFTEN HAVE YOU HAD SHORTNESS OF BREATH: ONCE OR TWICE A WEEK
QUESTION_4 LAST FOUR WEEKS HOW OFTEN HAVE YOU USED YOUR RESCUE INHALER OR NEBULIZER MEDICATION (SUCH AS ALBUTEROL): ONE OR TWO TIMES PER DAY
QUESTION_5 LAST FOUR WEEKS HOW WOULD YOU RATE YOUR ASTHMA CONTROL: SOMEWHAT CONTROLLED
QUESTION_1 LAST FOUR WEEKS HOW MUCH OF THE TIME DID YOUR ASTHMA KEEP YOU FROM GETTING AS MUCH DONE AT WORK, SCHOOL OR AT HOME: A LITTLE OF THE TIME
QUESTION_3 LAST FOUR WEEKS HOW OFTEN DID YOUR ASTHMA SYMPTOMS (WHEEZING, COUGHING, SHORTNESS OF BREATH, CHEST TIGHTNESS OR PAIN) WAKE YOU UP AT NIGHT OR EARLIER THAN USUAL IN THE MORNING: TWO OR THREE NIGHTS A WEEK

## 2020-02-03 ASSESSMENT — MIFFLIN-ST. JEOR: SCORE: 1888.42

## 2020-02-03 NOTE — PROGRESS NOTES
HPI:       Sukhdev Price is a 29 year old  male who presents for follow up of concern(s) listed below    Wheezing:  -Using albuterol once or two times a day  -Has used flovent in the past, and this really helped him a lot  -Waking up at 3-4 times per week with wheezing  -Can't do cardio workouts without getting short of breath and needing to use the albuterol  -Shortness of breath bothers him 1-2 times per week  -No ER visits, no hospitalizations  -No spirometry  -Triggers include animals, dust, exercise, after a hard laugh.          PMHX:     Patient Active Problem List   Diagnosis     Reactive airway disease with wheezing, mild persistent, uncomplicated       Current Outpatient Medications   Medication Sig Dispense Refill     albuterol (PROAIR HFA/PROVENTIL HFA/VENTOLIN HFA) 108 (90 Base) MCG/ACT inhaler Inhale 2 puffs into the lungs every 4 hours as needed for shortness of breath / dyspnea or wheezing 1 Inhaler 0       Social History     Socioeconomic History     Marital status: Single     Spouse name: Not on file     Number of children: Not on file     Years of education: Not on file     Highest education level: Not on file   Occupational History     Not on file   Social Needs     Financial resource strain: Not on file     Food insecurity:     Worry: Not on file     Inability: Not on file     Transportation needs:     Medical: Not on file     Non-medical: Not on file   Tobacco Use     Smoking status: Never Smoker     Smokeless tobacco: Never Used   Substance and Sexual Activity     Alcohol use: Yes     Comment: Ocassionally, twice a month     Drug use: No     Sexual activity: Yes     Partners: Female   Lifestyle     Physical activity:     Days per week: Not on file     Minutes per session: Not on file     Stress: Not on file   Relationships     Social connections:     Talks on phone: Not on file     Gets together: Not on file     Attends [employer] service: Not on file     Active member of club or organization:  "Not on file     Attends meetings of clubs or organizations: Not on file     Relationship status: Not on file     Intimate partner violence:     Fear of current or ex partner: Not on file     Emotionally abused: Not on file     Physically abused: Not on file     Forced sexual activity: Not on file   Other Topics Concern     Parent/sibling w/ CABG, MI or angioplasty before 65F 55M? Not Asked   Social History Narrative     Not on file          Allergies   Allergen Reactions     Nka [No Known Allergies]        No results found for this or any previous visit (from the past 24 hour(s)).         Review of Systems:     A 10 point review of systems including constitutional, cardiovascular, respiratory, HEENT, GI, , neurological, MSK, skin, endocrine, is negative unless stated in HPI          Physical Exam:     Vitals:    02/03/20 1556   BP: 124/77   Pulse: 74   Resp: 18   Temp: 98.2  F (36.8  C)   SpO2: 96%   Weight: 94.9 kg (209 lb 3.2 oz)   Height: 1.727 m (5' 8\")     Body mass index is 31.81 kg/m .    GENERAL APPEARANCE: healthy, alert and no distress  RESP: lungs clear to auscultation - no rales, rhonchi or wheezes  CV: regular rate and rhythm,  and no murmur, click,  rub or gallop  SKIN: no suspicious lesions or rashes  PSYCH: mood and affect normal/bright      Assessment and Plan     1. Reactive airway disease with wheezing, mild persistent, uncomplicated  Likely asthma. Patient doesn't have time to do spirometry today. Will restart on daily controller and can titrate based on symptoms. Should have him do rocío at some point in time.   - fluticasone (FLOVENT HFA) 110 MCG/ACT inhaler; Inhale 2 puffs into the lungs 2 times daily  Dispense: 12 g; Refill: 1  - albuterol (PROAIR HFA/PROVENTIL HFA/VENTOLIN HFA) 108 (90 Base) MCG/ACT inhaler; Inhale 2 puffs into the lungs every 4 hours as needed for shortness of breath / dyspnea or wheezing  Dispense: 1 Inhaler; Refill: 0          Options for treatment and follow-up care " were reviewed with the patient and/or guardian. Xai Dee and/or guardian engaged in the decision making process and verbalized understanding of the options discussed and agreed with the final plan.    Alvaro Hinkle MD  [facility] Resident, PGY2      Precepted today with: Woody Gray MD

## 2020-02-04 ASSESSMENT — ASTHMA QUESTIONNAIRES: ACT_TOTALSCORE: 15

## 2020-02-04 NOTE — PROGRESS NOTES
Preceptor Attestation:   Patient seen, evaluated and discussed with the resident. I have verified the content of the note, which accurately reflects my assessment of the patient and the plan of care.    Supervising Physician:Woody Gray MD    [facility]

## 2020-04-13 ENCOUNTER — VIRTUAL VISIT (OUTPATIENT)
Dept: FAMILY MEDICINE | Facility: CLINIC | Age: 30
End: 2020-04-13
Payer: COMMERCIAL

## 2020-04-13 VITALS — WEIGHT: 205 LBS | HEIGHT: 68 IN | BODY MASS INDEX: 31.07 KG/M2

## 2020-04-13 DIAGNOSIS — J45.30 REACTIVE AIRWAY DISEASE WITH WHEEZING, MILD PERSISTENT, UNCOMPLICATED: ICD-10-CM

## 2020-04-13 DIAGNOSIS — J45.30 MILD PERSISTENT ASTHMA WITHOUT COMPLICATION: Primary | ICD-10-CM

## 2020-04-13 ASSESSMENT — ASTHMA QUESTIONNAIRES
QUESTION_1 LAST FOUR WEEKS HOW MUCH OF THE TIME DID YOUR ASTHMA KEEP YOU FROM GETTING AS MUCH DONE AT WORK, SCHOOL OR AT HOME: A LITTLE OF THE TIME
QUESTION_4 LAST FOUR WEEKS HOW OFTEN HAVE YOU USED YOUR RESCUE INHALER OR NEBULIZER MEDICATION (SUCH AS ALBUTEROL): ONE OR TWO TIMES PER DAY
QUESTION_2 LAST FOUR WEEKS HOW OFTEN HAVE YOU HAD SHORTNESS OF BREATH: ONCE OR TWICE A WEEK
ACT_TOTALSCORE: 20
QUESTION_5 LAST FOUR WEEKS HOW WOULD YOU RATE YOUR ASTHMA CONTROL: COMPLETELY CONTROLLED
QUESTION_3 LAST FOUR WEEKS HOW OFTEN DID YOUR ASTHMA SYMPTOMS (WHEEZING, COUGHING, SHORTNESS OF BREATH, CHEST TIGHTNESS OR PAIN) WAKE YOU UP AT NIGHT OR EARLIER THAN USUAL IN THE MORNING: NOT AT ALL

## 2020-04-13 ASSESSMENT — MIFFLIN-ST. JEOR: SCORE: 1869.37

## 2020-04-13 NOTE — PATIENT INSTRUCTIONS
My Asthma Action Plan  Name: Sukhdev Price  YOB: 1990  Date: 4/13/2020   My doctor: Alvaro Hinkle   My clinic:   [facility] [address] [place] [address]  [phone]    My Asthma Severity: Mild Persistent Avoid your asthma triggers: dust mites, animal dander and exercise or sports      GREEN ZONE   Good Control    I feel good    No cough or wheeze    Can work, sleep and play without asthma symptoms       Take your asthma control medicine every day.  Take the medications listed below daily.    Flovent   mcg 1 puff twice a day    1. If exercise triggers your asthma, take your rescue medication (2 puffs of albuterol, Ventolin/Pro-Air) 15 minutes before exercise or sports, and during exercise if you have asthma symptoms.  2. Spacer to use with inhaler: If you have a spacer, make sure to use it with your inhaler.              YELLOW ZONE Getting Worse  I have ANY of these:    I do not feel good    Cough or wheeze    Chest feels tight    Wake up at night   1. Keep taking your Green Zone medications.  2. Start taking your rescue medicine (1-2 puffs of albuterol - Ventolin/Pro-Air) every 4-6 hours as needed.  3. If symptoms are not controlled with above, can take 2 puffs every 20 minutes for up to 1 hour, then continue every 4 hours if needed.   4. If you do not return to the Green Zone in 12-24 hours or you get worse, call the clinic.         RED ZONE Medical Alert - Get Help  I have ANY of these:    I feel awful    Medicine is not helping    Breathing getting harder    Trouble walking or talking    Nose opens wide to breathe       1. Take your rescue medicine NOW (6-8 puffs of albuterol - Ventolin/Pro-Air) for every 20 minutes for up to 1 hour.  2. Call your doctor NOW.  3. If you are still in the Red Zone after 20 minutes and you have not reached your doctor:    Take your rescue medicine again (6-8 puffs of albuterol - Ventolin/Pro-Air) and    Call 911 or go to the emergency  room right away    See your regular doctor within 1 weeks of an Emergency Room or Urgent Care visit for follow-up treatment.        This Asthma Action Plan provides authorization for the administration of medication described in the AAP.  YES  Electronically signed by: Alvaro Hinkle MD    Annual Reminders:  Meet with Asthma Educator,  Flu Shot in the Fall, Pneumonia Shot  Pharmacy: [id number] - [place], [place] - [address] AT [address]

## 2020-04-13 NOTE — PROGRESS NOTES
"Family Medicine Telephone Visit Note               Telephone Visit Consent   Patient was verbally read the following and verbal consent was obtained.    \"This telephone visit will be conducted via a call between you and your physician/provider. We have found that certain health care needs can be provided without the need for a physical exam.  This service lets us provide the care you need with a short phone conversation.  If a prescription is necessary we can send it directly to your pharmacy.  If lab work is needed we can place an order for that and you can then stop by our lab to have the test done at a later time.    Telephone visits are billed at different rates depending on your insurance coverage. During this emergency period, for some insurers they may be billed the same as an in-person visit.  Please reach out to your insurance provider with any questions.    If during the course of the call the physician/provider feels a telephone visit is not appropriate, you will not be charged for this service.\"    Name person giving consent:  Patient   Date verbal consent given:  4/13/2020  Time verbal consent given:  1:53 PM           Chief Complaint   Patient presents with     Asthma     Medication Reconciliation              HPI   Patients name: Sukhdev  Appointment start time:  2:00 PM    Asthma:  -Patient states asthma, RAD very well controlled since starting flovent at last visit. 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    Current Outpatient Medications   Medication Sig Dispense Refill     albuterol (PROAIR HFA/PROVENTIL HFA/VENTOLIN HFA) 108 (90 Base) MCG/ACT inhaler Inhale 2 puffs into the " "lungs every 4 hours as needed for shortness of breath / dyspnea or wheezing 1 Inhaler 0     fluticasone (FLOVENT HFA) 110 MCG/ACT inhaler Inhale 2 puffs into the lungs 2 times daily 12 g 1     Allergies   Allergen Reactions     Nka [No Known Allergies]               Review of Systems:     ROS COMP: A 10 point review of systems including constitutional, cardiovascular, respiratory, HEENT, GI, , neurological, MSK, skin, endocrine, is negative unless stated in HPI         Physical Exam:     Ht 1.727 m (5' 8\")   Wt 93 kg (205 lb)   BMI 31.17 kg/m    Estimated body mass index is 31.17 kg/m  as calculated from the following:    Height as of this encounter: 1.727 m (5' 8\").    Weight as of this encounter: 93 kg (205 lb).    Exam:  Constitutional: healthy, alert and no distress  Psychiatric: mentation appears normal and affect normal/bright  RESP: Speaking in complete sentences, does not sound SOB    Diagnostic Test Results:  Labs reviewed in Epic        Assessment and Plan   1. Mild persistent asthma without complication  2. Reactive airway disease with wheezing, mild persistent, uncomplicated  Doing well on flovent. Was taking prescribed dose a little different, so we will keep him on low dose ICS with flovent 110 mcg 1 puff BID. No refills needed. Discussed AAP and will put in AVS. Discussed plan if he gets COVID and what to do.    After Visit Information:  Will print and mail AVS     No follow-ups on file.    Appointment end time: 2:11 PM  This is a telephone visit that took 11 minutes.      Clinician location:  [facility]    Alvaro Hinkle MD  I precepted today with Dr. Telles.        "

## 2020-04-14 ASSESSMENT — ASTHMA QUESTIONNAIRES: ACT_TOTALSCORE: 20

## 2020-07-16 DIAGNOSIS — J45.30 REACTIVE AIRWAY DISEASE WITH WHEEZING, MILD PERSISTENT, UNCOMPLICATED: ICD-10-CM

## 2020-07-16 RX ORDER — ALBUTEROL SULFATE 90 UG/1
AEROSOL, METERED RESPIRATORY (INHALATION)
Qty: 6.7 G | Refills: 0 | Status: SHIPPED | OUTPATIENT
Start: 2020-07-16 | End: 2020-07-31

## 2020-07-16 NOTE — LETTER
July 20, 2020      Ms. Santizo Dee  [address] [facility] [facility] [address]        Dear Sukhdev,    We have tried reaching you on your phone and states it is no longer in service. Please also call to update your demographics.    I am writing to remind you that it is time for us to meet again to discuss your asthma.  As you may know, asthma can cause serious health problems and affect your ability to enjoy life.  Fortunately most asthma attacks can be prevented by taking appropriate medications and monitoring your asthma.    Please call the clinic to make an appointment for a ASTHMA VISIT with me as soon as you are able.    The following is a list of items that you are due to have done.     Please bring this letter with you to your appointment so that you can go to lab before your appointment.    Test:  Spirometry (Every year)  Immunizations Needed:  None    Referrals:  Yearly asthma  education    Items to review during your physician visit:  Self-management plan    I look forward to seeing you back soon.    Sincerely,    Alvaro Hinkle MD

## 2020-07-18 DIAGNOSIS — J45.30 REACTIVE AIRWAY DISEASE WITH WHEEZING, MILD PERSISTENT, UNCOMPLICATED: ICD-10-CM

## 2020-07-20 RX ORDER — DEXAMETHASONE 4 MG/1
TABLET ORAL
Qty: 12 G | Refills: 3 | Status: SHIPPED | OUTPATIENT
Start: 2020-07-20 | End: 2022-02-17

## 2020-07-31 DIAGNOSIS — J45.30 REACTIVE AIRWAY DISEASE WITH WHEEZING, MILD PERSISTENT, UNCOMPLICATED: ICD-10-CM

## 2020-07-31 RX ORDER — ALBUTEROL SULFATE 90 UG/1
AEROSOL, METERED RESPIRATORY (INHALATION)
Qty: 6.7 G | Refills: 0 | Status: SHIPPED | OUTPATIENT
Start: 2020-07-31 | End: 2020-11-25

## 2020-11-06 NOTE — TELEPHONE ENCOUNTER
END OF SHIFT NOTE:    INTAKE/OUTPUT  11/04 0701 - 11/05 0700  In: -   Out: 800 [Urine:800]  Voiding: YES  Catheter: NO  Drain:              Flatus: Patient does have flatus present. Stool:  0 occurrences. Characteristics:  Stool Assessment  Stool Color: Other (Comment)(have not observed)  Stool Appearance: Loose  Stool Amount: Large  Stool Source/Status: Rectum    Emesis: 0 occurrences. Characteristics:        VITAL SIGNS  Patient Vitals for the past 12 hrs:   Temp Pulse Resp BP SpO2   11/05/20 1547 98 °F (36.7 °C) 82 17 (!) 161/81 97 %   11/05/20 1243    (!) 159/88    11/05/20 1107 98.2 °F (36.8 °C) 82 18 (!) 168/110 97 %       Pain Assessment  Pain Intensity 1: 7 (11/05/20 1600)  Pain Location 1: Ankle  Pain Intervention(s) 1: Rest, Repositioned  Patient Stated Pain Goal: 2    Ambulating  Yes    Shift report given to oncoming nurse at the bedside.     Eduardo Welch Tried calling patient and number is no longer in service. I will send a letter instead

## 2020-11-25 DIAGNOSIS — J45.30 REACTIVE AIRWAY DISEASE WITH WHEEZING, MILD PERSISTENT, UNCOMPLICATED: ICD-10-CM

## 2020-11-25 RX ORDER — ALBUTEROL SULFATE 90 UG/1
AEROSOL, METERED RESPIRATORY (INHALATION)
Qty: 6.7 G | Refills: 3 | Status: SHIPPED | OUTPATIENT
Start: 2020-11-25 | End: 2022-02-17

## 2021-06-15 ENCOUNTER — HOSPITAL ENCOUNTER (EMERGENCY)
Dept: EMERGENCY MEDICINE | Facility: CLINIC | Age: 31
Discharge: HOME | End: 2021-06-15
Payer: COMMERCIAL

## 2021-06-15 DIAGNOSIS — J06.9 UPPER RESPIRATORY TRACT INFECTION, UNSPECIFIED TYPE: ICD-10-CM

## 2021-06-15 LAB — SARS-COV-2 PCR RESULT-HE - HISTORICAL: NEGATIVE

## 2021-06-15 ASSESSMENT — MIFFLIN-ST. JEOR: SCORE: 1843.5

## 2021-06-16 ENCOUNTER — COMMUNICATION - HEALTHEAST (OUTPATIENT)
Dept: SCHEDULING | Facility: CLINIC | Age: 31
End: 2021-06-16

## 2021-06-25 NOTE — ED TRIAGE NOTES
Patient is here with a cough, runny nose, sore throat, chills and shortness of breath that started yesterday.

## 2021-06-26 NOTE — ED PROVIDER NOTES
EMERGENCY DEPARTMENT ENCOUNTER      NAME: Sukhdev Price  AGE: 31 y.o. male  YOB: 1990  MRN: [id number]  EVALUATION DATE & TIME: 6/15/2021  8:05 AM    PCP: Provider, No Primary Care    ED PROVIDER: Siena Chen PA-C      Chief Complaint   Patient presents with     Sore Throat     Shortness of Breath     Cough         FINAL IMPRESSION:  1. Upper respiratory tract infection, unspecified type          ED COURSE & MEDICAL DECISION MAKING:    Pertinent Labs & Imaging studies reviewed. (See chart for details)    31 y.o. male presents to the Emergency Department for evaluation of URI symptoms.    Physical exam is remarkable for a well-appearing male who is in no acute distress.  Heart and lung sounds are clear diffusely throughout.  Abdomen is soft and nontender.  Oropharynx is unremarkable.  Vital signs remarkable for hypertension, remainder are stable. Initially afebrile, patient did develop a fever of 101F.     COVID-19 test was negative.  Chest x-ray unremarkable with no evidence of pneumonia.    I do not think any further emergent labs or imaging are indicated at this time.  The patient appears clinically well and is in no respiratory distress.  Discussed that this is likely a viral illness of some sort, recommend comfort care at home with Tylenol/ibuprofen, over-the-counter flu medication, and follow-up with his primary care provider if symptoms do not improve.  Low concern for COVID-19 given that he is fully vaccinated.  Patient has an albuterol inhaler at home to use, encouraged use of this every 4-6 hours as needed for cough.  Recommend return to the ER if he develops any new or worsening symptoms like severe pain, fever despite antipyretics, persistent vomiting, chest pain or difficulty breathing, hemoptysis, or any other concerning symptoms.  The patient is amenable to this treatment plan and verbalized his understanding.    ED Course   8:18 AM I saw the patient wearing an eye shield, surgical mask,  N95, and gloves.    9:17 AM I checked in and updated patient on work up results and plan of care. I discussed the plan for discharge with the patient, and patient is agreeable. We discussed supportive cares at home and reasons for return to the ER including new or worsening symptoms - all questions and concerns addressed. Patient to be discharged by RN.    At the conclusion of the encounter I discussed the results of all of the tests and the disposition. The questions were answered. The patient or family acknowledged understanding and was agreeable with the care plan.     Voice recognition software was used in the creation of this note. Any grammatical or nonsensical errors are due to inherent errors with the software and are not the intention of the writer.     MEDICATIONS GIVEN IN THE EMERGENCY:  Medications - No data to display    NEW PRESCRIPTIONS STARTED AT TODAY'S ER VISIT  There are no discharge medications for this patient.         =================================================================    HPI    Patient information was obtained from: Patient    Use of Intrepreter: N/A         Sukhdev Price is a 31 y.o. male with PMH of mild asthma who presents alone to the ED for evaluation of URI symptoms.     The patient reports that yesterday, he developed a sore throat and cough.  The cough has been productive of occasional green mucus, denies hemoptysis.  Today, he developed chills and subjective fever.  He also reports nausea this morning but states his appetite has been poor.  His son is sick with similar symptoms.  The patient has not tried any treatments but did note improvement in shortness of breath associated with the cough after using his albuterol inhaler.  He denies any chest pain, persistent vomiting, ear pain, loss of taste or smell.  He is fully vaccinated against COVID-19.      REVIEW OF SYSTEMS   Review of Systems   Constitutional: Positive for appetite change, chills and fever (Subjective).   HENT:  Positive for rhinorrhea and sore throat.         Denies loss of taste or smell     Respiratory: Positive for cough and shortness of breath (Secondary to cough).    Cardiovascular: Negative for chest pain.   Gastrointestinal: Positive for nausea. Negative for abdominal pain, diarrhea and vomiting.   Musculoskeletal: Negative for myalgias.   Skin: Negative for rash.   Neurological: Negative for headaches.         All other systems reviewed and are negative unless noted in HPI.    PAST MEDICAL HISTORY:  No past medical history on file.    PAST SURGICAL HISTORY:  No past surgical history on file.    CURRENT MEDICATIONS:    No current facility-administered medications on file prior to encounter.      No current outpatient medications on file prior to encounter.       ALLERGIES:  No Known Allergies    FAMILY HISTORY:  No family history on file.    SOCIAL HISTORY:   Social History     Socioeconomic History     Marital status: Single     Spouse name: Not on file     Number of children: Not on file     Years of education: Not on file     Highest education level: Not on file   Occupational History     Not on file   Social Needs     Financial resource strain: Not on file     Food insecurity     Worry: Not on file     Inability: Not on file     Transportation needs     Medical: Not on file     Non-medical: Not on file   Tobacco Use     Smoking status: Not on file   Substance and Sexual Activity     Alcohol use: Not on file     Drug use: Not on file     Sexual activity: Not on file   Lifestyle     Physical activity     Days per week: Not on file     Minutes per session: Not on file     Stress: Not on file   Relationships     Social connections     Talks on phone: Not on file     Gets together: Not on file     Attends [employer] service: Not on file     Active member of club or organization: Not on file     Attends meetings of clubs or organizations: Not on file     Relationship status: Not on file     Intimate partner violence     " Fear of current or ex partner: Not on file     Emotionally abused: Not on file     Physically abused: Not on file     Forced sexual activity: Not on file   Other Topics Concern     Not on file   Social History Narrative     Not on file       VITALS:  Patient Vitals for the past 24 hrs:   BP Temp Temp src Pulse Resp SpO2 Height Weight   06/15/21 0938 (!) 184/98 (!) 101  F (38.3  C) Oral 94 20 97 % -- --   06/15/21 0806 (!) 184/98 98.8  F (37.1  C) Oral 94 16 97 % 5' 7\" (1.702 m) 205 lb (93 kg)       PHYSICAL EXAM    VITAL SIGNS: BP (!) 184/98   Pulse 94   Temp (!) 101  F (38.3  C) (Oral)   Resp 20   Ht 5' 7\" (1.702 m)   Wt 205 lb (93 kg)   SpO2 97%   BMI 32.11 kg/m    General Appearance: Alert, cooperative, normal speech and facial symmetry, appears stated age, the patient does not appear in distress  Head:  Normocephalic, without obvious abnormality, atraumatic  Eyes: Conjunctiva/corneas clear, EOM's intact, no nystagmus, PERRL  ENT:  Lips, mucosa, and tongue normal; teeth and gums normal, no pharyngeal inflammation, no dysphonia or difficulty swallowing, membranes are moist without pallor  Cardio:  Regular rate and rhythm, S1 and S2 normal, no murmur, rub    or gallop, 2+ pulses symmetric in all extremities  Pulm:  Clear to auscultation bilaterally, respirations unlabored with no accessory muscle use  Abdomen:  Abdomen is soft, non-distended with no tenderness to palpation, rebound tenderness, or guarding.   Extremities:  No calf tenderness, moves all extremities  Neuro: Patient is awake, alert, and responsive to voice. No gross motor weaknesses or sensory loss; moves all extremities.     LAB:  All pertinent labs reviewed and interpreted.  Results for orders placed or performed during the hospital encounter of 06/15/21   Symptomatic SARS-CoV-2 (COVID-19)-PCR    Specimen: Respiratory   Result Value Ref Range    SARS-CoV-2 PCR Result Negative Negative, Invalid       RADIOLOGY:  Reviewed all pertinent imaging. " Please see official radiology report.  Xr Chest 1 View Portable    Result Date: 6/15/2021  EXAM: XR CHEST 1 VIEW PORTABLE LOCATION: [facility] DATE/TIME: 6/15/2021 8:50 AM INDICATION: Cough. History of asthma. COMPARISON: 4/30/2019     Negative chest. No interval change.          IBoo , am serving as a scribe to document services personally performed by Siena Chen PA-C based on my observation and the provider's statements to me. I, Siena Chen PA-C attest that Boo Ugarte  is acting in a scribe capacity, has observed my performance of the services and has documented them in accordance with my direction.     Siena Chen PA-C  Emergency Medicine  [facility] EMERGENCY ROOM  [address] [address]  Dept: [phone]  Loc: [phone]     Siena Chen PA-C  06/15/21 1028

## 2021-07-06 VITALS — HEIGHT: 67 IN | WEIGHT: 205 LBS | BODY MASS INDEX: 32.18 KG/M2

## 2022-02-17 ENCOUNTER — VIRTUAL VISIT (OUTPATIENT)
Dept: FAMILY MEDICINE | Facility: CLINIC | Age: 32
End: 2022-02-17
Payer: COMMERCIAL

## 2022-02-17 DIAGNOSIS — J45.30 MILD PERSISTENT ASTHMA WITHOUT COMPLICATION: Primary | ICD-10-CM

## 2022-02-17 PROCEDURE — 99213 OFFICE O/P EST LOW 20 MIN: CPT | Mod: 95 | Performed by: STUDENT IN AN ORGANIZED HEALTH CARE EDUCATION/TRAINING PROGRAM

## 2022-02-17 RX ORDER — BUDESONIDE AND FORMOTEROL FUMARATE DIHYDRATE 80; 4.5 UG/1; UG/1
2 AEROSOL RESPIRATORY (INHALATION)
Qty: 10.2 G | Refills: 3 | Status: SHIPPED | OUTPATIENT
Start: 2022-02-17 | End: 2024-04-11

## 2022-02-17 ASSESSMENT — ASTHMA QUESTIONNAIRES: ACT_TOTALSCORE: 20

## 2022-02-17 NOTE — PROGRESS NOTES
Preceptor Attestation:   Patient seen, evaluated and discussed with the resident. I have verified the content of the note, which accurately reflects my assessment of the patient and the plan of care.    Supervising Physician:Irena Espinosa MD    [facility]

## 2022-02-17 NOTE — PATIENT INSTRUCTIONS
What is SMART Therapy?    SMART is a new way of approaching your asthma care that gives you more medicine when you need it most.     So does this mean I won t be using albuterol anymore?    Correct! You will use just one inhaler for everyday asthma prevention and for acute symptoms (for example: shortness breath, wheezing, etc.). You no longer need to remember which inhaler is which!     My albuterol works great to decrease my breathing difficulties, why shouldn t I use it?    Overuse of albuterol has been associated with increased airway irritation, increased risk of asthma flares, and decreased response to albuterol over time - meaning if you require hospitalization for your asthma and are in need of albuterol to treat severe symptoms, it may be less effective for you.    Why are we changing my inhalers?    Studies show that greater asthma control is achieved with SMART therapy versus your current regimen. Greater asthma control here means fewer flares of your asthma that may lead to emergency department visits or hospitalizations.    This change will enable you to get enough of the preventive medication to control your asthma long-term.

## 2022-02-17 NOTE — PROGRESS NOTES
"Family Medicine Telephone Visit Note      Chief Complaint   Patient presents with     RECHECK     asthma and refills on inhaler            HPI   Patients name: Sukhdev  Appointment start time:  11:13 AM       Mild Asthma  Requesting refills of inhalers  Albuterol inhaler, uses 1-2 times per week.  Often can go with out it.     Flovent is prescribed daily, often only uses three times a week every night, helps, now controlled.  Not using every day.     COVID-19 in December, no shortness of breath.        Triggers are mainly dust/animals.  Does have a pet dog at home.  Has not used antihistamines/anti allergy medications    Current Outpatient Medications   Medication Sig Dispense Refill     budesonide-formoterol (SYMBICORT) 80-4.5 MCG/ACT Inhaler Inhale 2 puffs into the lungs every 2 hours as needed (shortness of breath, wheezing) 10.2 g 3     Allergies   Allergen Reactions     Nka [No Known Allergies]               Review of Systems:     Constitutional, HEENT, cardiovascular, pulmonary, GI, , musculoskeletal, neuro, skin, endocrine and psych systems are negative, except as otherwise noted.         Physical Exam:     There were no vitals taken for this visit.  Estimated body mass index is 32.11 kg/m  as calculated from the following:    Height as of 6/15/21: 1.702 m (5' 7\").    Weight as of 6/15/21: 93 kg (205 lb).    Exam:  Constitutional: healthy, alert and no distress  Psychiatric: mentation appears normal and affect normal/bright          Assessment and Plan       ICD-10-CM    1. Mild persistent asthma without complication  J45.30 budesonide-formoterol (SYMBICORT) 80-4.5 MCG/ACT Inhaler   2. Reactive airway disease with wheezing, mild persistent, uncomplicated  J45.30 budesonide-formoterol (SYMBICORT) 80-4.5 MCG/ACT Inhaler       Refilled medications that would be required in the next 3 months.     After Visit Information:  Will print and mail AVS     Return in about 4 weeks (around 3/17/2022).    Appointment end " time: 11:25 AM    Clinician location:  [facility]     Martine Valdivia MD  I precepted today with Dr. Espinosa.

## 2022-02-21 NOTE — PROGRESS NOTES
"Family Medicine Video Visit Note  Sukhdev is being evaluated via a billable video visit.                  Video Visit Consent      Patient was verbally read the following and verbal consent was obtained.  \"Video visits are billed at different rates depending on your insurance coverage. During this emergency period, for some insurers they may be billed the same as an in-person visit.  Please reach out to your insurance provider with any questions.  If during the course of the call the physician/provider feels a video visit is not appropriate, you will not be charged for this service.\"      (Name person giving consent:  Patient   Date verbal consent given:  2/17/2022  Time verbal consent given:  10:42 AM)     Patient would like the video invitation sent by: Text to cell phone: [phone]             Chief Complaint   Patient presents with     RECHECK       asthma and refills on inhaler              HPI         Video transitioned to telephone as patient did not have access to wifi: Start Time: 11:13 AM     Sukhdev presents to clinic today for the following health issues:     Mild Asthma  Requesting refills of inhalers  Albuterol inhaler, uses 1-2 times per week.  Often can go with out it.     Flovent is prescribed daily, often only uses three times a week every night, helps, now controlled.  Not using every day.     COVID-19 in December, no shortness of breath.       Triggers are mainly dust/animals.  Does have a pet dog at home.  Has not used antihistamines/anti allergy medications.     Current Outpatient Prescriptions          Current Outpatient Medications   Medication Sig Dispense Refill     albuterol (PROAIR HFA/PROVENTIL HFA/VENTOLIN HFA) 108 (90 Base) MCG/ACT inhaler INHALE 2 PUFFS INTO THE LUNGS EVERY 4 HOURS AS NEEDED FOR SHORTNESS OF BREATH OR DIFFICULT BREATHING OR WHEEZING 6.7 g 3     FLOVENT  MCG/ACT inhaler INHALE 2 PUFFS INTO THE LUNGS TWICE DAILY 12 g 3              Allergies   Allergen Reactions     " "Nka [No Known Allergies]                   Review of Systems:      Constitutional, HEENT, cardiovascular, pulmonary, gi and gu systems are negative, except as otherwise noted.          Physical Exam:      There were no vitals taken for this visit.  Estimated body mass index is 32.11 kg/m  as calculated from the following:    Height as of 6/15/21: 1.702 m (5' 7\").    Weight as of 6/15/21: 93 kg (205 lb).     GENERAL: Healthy, alert and no distress  RESP: No audible wheeze, cough. Speaking in full sentences without difficulty.  NEURO: Cranial nerves grossly intact.  Mentation and speech appropriate for age.  PSYCH: Mentation appears normal, affect normal/bright, judgement and insight intact, normal speech.            Assessment and Plan          ICD-10-CM     1. Mild persistent asthma without complication  J45.30 budesonide-formoterol (SYMBICORT) 80-4.5 MCG/ACT Inhaler   2. Reactive airway disease with wheezing, mild persistent, uncomplicated  J45.30 budesonide-formoterol (SYMBICORT) 80-4.5 MCG/ACT Inhaler     Candidate for smart therapy. Plan to use symbicort PRN as patient is well controlled without daily inhaler use currently. Close follow up in 1 month, may need to increase to daily use if symptoms are not controlled.     Refilled medications that would be required in the next 3 months.      After Visit Information:  Will print and mail AVS         Follow up in 1 month.     Video-Visit Details: transitioned to telephone     Type of service:  Telephone Visit     Video(transitioned to telephone End Time (time video stopped): 11:25    Originating Location (pt. Location): Home     Distant Location (provider location):  [facility]      Martine Valdivia MD  I precepted today with Dr. Espinosa.        "

## 2022-06-05 ENCOUNTER — HOSPITAL ENCOUNTER (EMERGENCY)
Facility: HOSPITAL | Age: 32
Discharge: HOME | End: 2022-06-05
Attending: EMERGENCY MEDICINE | Admitting: EMERGENCY MEDICINE
Payer: COMMERCIAL

## 2022-06-05 ENCOUNTER — APPOINTMENT (OUTPATIENT)
Dept: RADIOLOGY | Facility: HOSPITAL | Age: 32
End: 2022-06-05
Attending: EMERGENCY MEDICINE
Payer: COMMERCIAL

## 2022-06-05 VITALS
RESPIRATION RATE: 15 BRPM | DIASTOLIC BLOOD PRESSURE: 89 MMHG | SYSTOLIC BLOOD PRESSURE: 148 MMHG | OXYGEN SATURATION: 98 % | HEART RATE: 71 BPM | BODY MASS INDEX: 32.58 KG/M2 | WEIGHT: 215 LBS | TEMPERATURE: 98.6 F | HEIGHT: 68 IN

## 2022-06-05 DIAGNOSIS — T18.108A ESOPHAGEAL FOREIGN BODY, INITIAL ENCOUNTER: ICD-10-CM

## 2022-06-05 PROCEDURE — 70360 X-RAY EXAM OF NECK: CPT

## 2022-06-05 PROCEDURE — 99283 EMERGENCY DEPT VISIT LOW MDM: CPT

## 2022-06-05 ASSESSMENT — ENCOUNTER SYMPTOMS
SHORTNESS OF BREATH: 0
TROUBLE SWALLOWING: 1

## 2022-06-06 ENCOUNTER — PATIENT OUTREACH (OUTPATIENT)
Dept: FAMILY MEDICINE | Facility: CLINIC | Age: 32
End: 2022-06-06
Payer: COMMERCIAL

## 2022-06-06 NOTE — ED TRIAGE NOTES
Patient reports he believes to have a piece of chicken stuck in his esophagus.  Reports he is not able to drink or eat food.  This happened this AM at 1000.  Patient appears to be in NAD and is able to swallow secretions without difficulties.      Triage Assessment     Row Name 06/05/22 1938       Triage Assessment (Adult)    Airway WDL X;airway symptoms    Airway Symptoms other (see comments)  patient believes to have chicken stuck but is breathing and maintaining airway/secretions without difficulties       Respiratory WDL    Respiratory WDL WDL       Skin Circulation/Temperature WDL    Skin Circulation/Temperature WDL WDL       Cardiac WDL    Cardiac WDL WDL       Peripheral/Neurovascular WDL    Peripheral Neurovascular WDL WDL       Cognitive/Neuro/Behavioral WDL    Cognitive/Neuro/Behavioral WDL WDL

## 2022-06-06 NOTE — PROGRESS NOTES
Clinic Care Coordination Contact    Follow Up Progress Note      Assessment: The pt was recently in the ED, I called to check up on the pt and help the pt setup a ED follow up. The pt was at [facility] for swollen foreign object. I called the pt, but got his vm, so I left a vm for the pt to give me a call back.     Care Gaps:    Health Maintenance Due   Topic Date Due     PREVENTIVE CARE VISIT  Never done     ADVANCE CARE PLANNING  Never done     HIV SCREENING  Never done     HEPATITIS C SCREENING  Never done     DTAP/TDAP/TD IMMUNIZATION (7 - Td or Tdap) 08/30/2012     ASTHMA ACTION PLAN  04/13/2021     COVID-19 Vaccine (3 - Booster for Pfizer series) 10/14/2021           Goals addressed this encounter:    Goals Addressed    None         Intervention/Education provided during outreach:               Plan:     Care Coordinator will follow up in month

## 2022-06-06 NOTE — DISCHARGE INSTRUCTIONS
It is recommended that you eat smaller bites of food and chew your food thoroughly.  It is recommended that you follow-up with a gastroenterologist for reevaluation given the recurrent nature of the esophageal foreign body episodes.  Return back to ED sooner for any repeat episodes of esophageal foreign bodies or any other new or concerning symptoms.

## 2022-06-06 NOTE — ED NOTES
"Pt presents Pt stated \"I had something stuck\" \"pointing to mid sternal area \"I think it was a piece of chicken\". \"I feel better now it feels like it went down and I could swallow water, I feel much better\". Pt requesting to speak with provider as he stated this isn't the first time he has had \"food stuck\". Denies pain/discomfort/CP/SOB. Denies N/V.   SKIN: WNL.   HEENT: Normocephalic, PERRL, alert and oriented x 4.   CHEST: Symmetrical rise, breath sounds are equal and clear bilaterally. No reported CP or SOB.  ABD: NTND. No reported N&V or diarrhea.  EXT: CASTELLANOS x 4  Rest of exam unremarkable.     "

## 2022-06-06 NOTE — ED PROVIDER NOTES
EMERGENCY DEPARTMENT ENCOUNTER      NAME: Sukhdev Price  AGE: 32 year old male  YOB: 1990  MRN: [id number]  EVALUATION DATE & TIME: 2022 10:40 PM    PCP: Martine Valdivia    ED PROVIDER: David Valenzuela DO      Chief Complaint   Patient presents with     Swallowed Foreign Body         FINAL IMPRESSION:  1. Esophageal foreign body, initial encounter          ED COURSE & MEDICAL DECISION MAKIN-year-old male presented to the ED for evaluation of an esophageal foreign body that occurred earlier this morning.  The patient states that he was able to clear the esophageal foreign body while waiting in the ED waiting room.  He was then able to drink water without any difficulty.  The patient stated that this has happened to him on multiple occasions in the past.  Here in the ED the patient was slightly hypertensive upon arrival but he was otherwise hemodynamically stable.  The patient's physical exam was unremarkable.    An x-ray was obtained of the neck prior to my evaluation.  Neck x-ray was nondiagnostic.    The patient was educated about esophageal foreign bodies and reassured.  Because this has been a reoccurring issue for him it was recommended that he follow-up with a gastroenterologist for further evaluation and upper endoscopy.  The patient was instructed to  Eat smaller bites of food and to chew his food thoroughly.  The patient was instructed to return back to the ED for any repeat episodes of esophageal foreign body impaction.    Pertinent Labs & Imaging studies reviewed. (See chart for details)  11:05 I met with the patient to gather history and to perform my initial exam. We discussed plans for the ED course, including diagnostic testing and treatment.   11:12 PM I discussed the plan for discharge with the patient, and patient is agreeable.  We discussed supportive cares at home and reasons for return to the ER including new or worsening symptoms - all questions and concerns addressed.   Patient to be discharged by RN.    At the conclusion of the encounter I discussed the results of all of the tests and the disposition. The questions were answered. The patient or family acknowledged understanding and was agreeable with the care plan.       PPE worn: n95 mask, goggles    MEDICATIONS GIVEN IN THE EMERGENCY:  Medications - No data to display    NEW PRESCRIPTIONS STARTED AT TODAY'S ER VISIT  Discharge Medication List as of 6/5/2022 11:13 PM             =================================================================    HPI    Patient information was obtained from: Patient.    Use of : N/A         Sukhdev Price is a 32 year old male with a pertinent history of anemia and asthma who presents to this ED by walk-in for evaluation of food stuck in chest.     At 11:00 AM, (~12 hours ago), patient was swallowing a piece of a boiled chicken thigh that had no bones in it when he felt it get stuck in his chest. States he vomited whenever he tried to eat or drink. Endorses this happens a lot for the past couple of years, but he is usually able to throw it up. Notes he was able to drink water while in the ED, thinks the chicken passed, and is is currently feeling fine.     Patient denies shortness of breath, current pain, discomfort, or any other complaints at this time.       REVIEW OF SYSTEMS   Review of Systems   HENT: Positive for trouble swallowing.    Respiratory: Negative for shortness of breath.    All other systems reviewed and are negative.       PAST MEDICAL HISTORY:  Past Medical History:   Diagnosis Date     Anemia      NO ACTIVE PROBLEMS      Uncomplicated asthma        PAST SURGICAL HISTORY:  Past Surgical History:   Procedure Laterality Date     NO HISTORY OF SURGERY             CURRENT MEDICATIONS:    budesonide-formoterol (SYMBICORT) 80-4.5 MCG/ACT Inhaler        ALLERGIES:  Allergies   Allergen Reactions     Nka [No Known Allergies]        FAMILY HISTORY:  Family History   Problem Relation  "Age of Onset     Diabetes No family hx of      Coronary Artery Disease No family hx of      Hypertension No family hx of      Breast Cancer No family hx of      Ovarian Cancer No family hx of      Cancer - colorectal No family hx of      Prostate Cancer No family hx of      Depression/Anxiety No family hx of      Cerebrovascular Disease No family hx of      Asthma No family hx of        SOCIAL HISTORY:   Social History     Socioeconomic History     Marital status: Single     Spouse name: None     Number of children: None     Years of education: None     Highest education level: None   Tobacco Use     Smoking status: Never Smoker     Smokeless tobacco: Never Used   Substance and Sexual Activity     Alcohol use: Yes     Comment: Ocassionally, twice a month     Drug use: No     Sexual activity: Yes     Partners: Female       VITALS:  BP (!) 148/89   Pulse 71   Temp 98.6  F (37  C) (Oral)   Resp 15   Ht 1.727 m (5' 8\")   Wt 97.5 kg (215 lb)   SpO2 98%   BMI 32.69 kg/m      PHYSICAL EXAM    General presentation: Alert, Vital signs reviewed. NAD  HENT: ENT inspection is normal. Oropharynx is moist and clear.   Eye: Pupils are equal and reactive to light. EOMI  Neck: The neck is supple, with full ROM, with no evidence of meningismus.  Pulmonary: Currently in no acute respiratory distress. Normal, non labored respirations, the lung sounds are normal with good equal air movement. Clear to auscultation bilaterally.   Circulatory: Regular rate and rhythm. Peripheral pulses are strong and equal. No murmurs, rubs, or gallops.   Abdominal: The abdomen is soft. Nontender. No rigidity, guarding, or rebound. Bowel sounds normal.   Neurologic: Alert, oriented to person, place, and time. No motor deficit. No sensory deficit. Cranial nerves II through XII are intact.  Musculoskeletal: No extremity tenderness. Full range of motion in all extremities. No extremity edema.   Skin: Skin color is normal. No rash. Warm. Dry to touch. "      LAB:  All pertinent labs reviewed and interpreted.  Results for orders placed or performed during the hospital encounter of 06/05/22   Neck soft tissue XR    Impression    IMPRESSION: No prevertebral edema. Normal appearance of the epiglottis and larynx. No airway compromise. On the AP view there is a linear density near midline at the level of the C4-C5 disc space level. This could correspond to the ossification   anteriorly of the thyroid cartilage. Recommend clinical correlation.       RADIOLOGY:  Reviewed all pertinent imaging. Please see official radiology report.  Neck soft tissue XR   Final Result   IMPRESSION: No prevertebral edema. Normal appearance of the epiglottis and larynx. No airway compromise. On the AP view there is a linear density near midline at the level of the C4-C5 disc space level. This could correspond to the ossification    anteriorly of the thyroid cartilage. Recommend clinical correlation.            I, Muna Cueva, am serving as a scribe to document services personally performed by David Valenzuela DO based on my observation and the provider's statements to me. I, David Valenzuela, attest that Muna Cueva is acting in a scribe capacity, has observed my performance of the services and has documented them in accordance with my direction.    David Valenzuela DO  Emergency Medicine  [facility] EMERGENCY DEPARTMENT  [address] [place] [id number] [phone]     David Valenzuela DO  06/06/22 0126

## 2022-06-07 ENCOUNTER — PATIENT OUTREACH (OUTPATIENT)
Dept: FAMILY MEDICINE | Facility: CLINIC | Age: 32
End: 2022-06-07
Payer: COMMERCIAL

## 2022-06-07 NOTE — PROGRESS NOTES
Clinic Care Coordination Contact    Follow Up Progress Note      Assessment:  The pt was recently in the ED, I called to check up on the pt, and help the pt setup a ED follow up. The pt was at [facility] for a swallowed foreign object. I called and talked to the pt, pt stated that he ate something, and it got stuck in his throat, and went to the ED, but then it past through his throat and its better now. Pt did not feel that he needs a follow up.     Care Gaps:    Health Maintenance Due   Topic Date Due     PREVENTIVE CARE VISIT  Never done     ADVANCE CARE PLANNING  Never done     HIV SCREENING  Never done     HEPATITIS C SCREENING  Never done     DTAP/TDAP/TD IMMUNIZATION (7 - Td or Tdap) 08/30/2012     ASTHMA ACTION PLAN  04/13/2021     COVID-19 Vaccine (3 - Booster for Pfizer series) 10/14/2021           Goals addressed this encounter:    Goals Addressed    None         Intervention/Education provided during outreach:               Plan:     Care Coordinator will follow up in month

## 2022-10-13 ENCOUNTER — HOSPITAL ENCOUNTER (EMERGENCY)
Facility: CLINIC | Age: 32
Discharge: HOME | End: 2022-10-13
Admitting: PHYSICIAN ASSISTANT
Payer: OTHER MISCELLANEOUS

## 2022-10-13 VITALS
TEMPERATURE: 98.7 F | DIASTOLIC BLOOD PRESSURE: 88 MMHG | HEART RATE: 74 BPM | RESPIRATION RATE: 16 BRPM | SYSTOLIC BLOOD PRESSURE: 152 MMHG | WEIGHT: 215 LBS | BODY MASS INDEX: 32.69 KG/M2 | OXYGEN SATURATION: 99 %

## 2022-10-13 DIAGNOSIS — M54.50 ACUTE BILATERAL LOW BACK PAIN WITHOUT SCIATICA: ICD-10-CM

## 2022-10-13 PROCEDURE — 250N000011 HC RX IP 250 OP 636: Performed by: PHYSICIAN ASSISTANT

## 2022-10-13 PROCEDURE — 250N000013 HC RX MED GY IP 250 OP 250 PS 637: Performed by: PHYSICIAN ASSISTANT

## 2022-10-13 PROCEDURE — 96372 THER/PROPH/DIAG INJ SC/IM: CPT | Performed by: PHYSICIAN ASSISTANT

## 2022-10-13 PROCEDURE — 99284 EMERGENCY DEPT VISIT MOD MDM: CPT

## 2022-10-13 RX ORDER — KETOROLAC TROMETHAMINE 15 MG/ML
15 INJECTION, SOLUTION INTRAMUSCULAR; INTRAVENOUS ONCE
Status: DISCONTINUED | OUTPATIENT
Start: 2022-10-13 | End: 2022-10-13

## 2022-10-13 RX ORDER — CYCLOBENZAPRINE HCL 10 MG
10 TABLET ORAL 3 TIMES DAILY PRN
Qty: 18 TABLET | Refills: 0 | Status: SHIPPED | OUTPATIENT
Start: 2022-10-13 | End: 2022-10-19

## 2022-10-13 RX ORDER — KETOROLAC TROMETHAMINE 15 MG/ML
15 INJECTION, SOLUTION INTRAMUSCULAR; INTRAVENOUS ONCE
Status: COMPLETED | OUTPATIENT
Start: 2022-10-13 | End: 2022-10-13

## 2022-10-13 RX ORDER — LIDOCAINE 4 G/G
1 PATCH TOPICAL ONCE
Status: DISCONTINUED | OUTPATIENT
Start: 2022-10-13 | End: 2022-10-13 | Stop reason: HOSPADM

## 2022-10-13 RX ORDER — CYCLOBENZAPRINE HCL 10 MG
10 TABLET ORAL ONCE
Status: DISCONTINUED | OUTPATIENT
Start: 2022-10-13 | End: 2022-10-13

## 2022-10-13 RX ADMIN — LIDOCAINE 1 PATCH: 246 PATCH TOPICAL at 14:52

## 2022-10-13 RX ADMIN — KETOROLAC TROMETHAMINE 15 MG: 15 INJECTION, SOLUTION INTRAMUSCULAR; INTRAVENOUS at 14:51

## 2022-10-13 ASSESSMENT — ENCOUNTER SYMPTOMS
NUMBNESS: 0
HEADACHES: 0
CHEST TIGHTNESS: 0
DYSURIA: 0
DIZZINESS: 0
WEAKNESS: 0
LIGHT-HEADEDNESS: 0
ABDOMINAL PAIN: 0
BACK PAIN: 1
FLANK PAIN: 0
HEMATURIA: 0

## 2022-10-13 ASSESSMENT — ACTIVITIES OF DAILY LIVING (ADL): ADLS_ACUITY_SCORE: 35

## 2022-10-13 NOTE — ED PROVIDER NOTES
"EMERGENCY DEPARTMENT ENCOUNTER      NAME: Sukhdev Price  AGE: 32 year old male  YOB: 1990  MRN: [id number]  EVALUATION DATE & TIME: No admission date for patient encounter.    PCP: Martine Valdivia    ED PROVIDER: Hema Ferro PA-C      Chief Complaint   Patient presents with     Back Pain       FINAL IMPRESSION:  1. Acute bilateral low back pain without sciatica      ED COURSE & MEDICAL DECISION MAKING:    Pertinent Labs & Imaging studies reviewed. (See chart for details)  2:48 PM PM I met the patient and performed my initial interview and exam. Discussed plan for medications, prescriptions to go home with, and pain management.     32 year old male presents to the Emergency Department for evaluation of   Low back pain.     ED Course as of 10/13/22 1459   Thu Oct 13, 2022   1457 Patient is a 32-year-old male with no significant past medical history presents emergency department for evaluation of low back pain.  Patient notes that he is a , he was carrying a heavy mail cart earlier yesterday, and developed onset of low back pain.  He initially took some Tylenol yesterday, this did help with pain, woke up this morning pain was worse, present to the emergency department.  Denies any numbness or tingling, no urinary symptoms, no red flag neurological signs.  No loss control of bowel or bladder.  Length\" chronic.  Is good strength and sensation to upper and lower extremities.  On examination he has no midline tenderness, some mild diffuse, low back tenderness.  Suspect this is all musculoskeletal.  No chest pain or shortness of breath.  No dizziness, lightheadedness, numbness or tingling.  No other significant past medical history, no history of back surgeries in the past.  Nothing to suggest imaging at this time.  Plan for symptomatic cares, recommend muscle relaxers at home, supportive cares including ibuprofen and Tylenol.  Patient is agreeable with this plan.  We given a dose of Toradol " here in emergency department, as well as lidocaine patch.  Plan for discharge, and follow-up with his primary care.        At the conclusion of the encounter I discussed the results of all of the tests and the disposition. The questions were answered. The patient or family acknowledged understanding and was agreeable with the care plan.     0 minutes of critical care time     MEDICATIONS GIVEN IN THE EMERGENCY:  Medications   Lidocaine (LIDOCARE) 4 % Patch 1 patch (1 patch Transdermal Patch/Med Applied 10/13/22 1452)   lidocaine patch in PLACE (has no administration in time range)   ketorolac (TORADOL) injection 15 mg (15 mg Intramuscular Given 10/13/22 1451)       NEW PRESCRIPTIONS STARTED AT TODAY'S ER VISIT  New Prescriptions    CYCLOBENZAPRINE (FLEXERIL) 10 MG TABLET    Take 1 tablet (10 mg) by mouth 3 times daily as needed for muscle spasms       =================================================================    HPI    Patient information was obtained from: Patient     Use of : N/A       Sukhdev Price is a 32 year old male with no significant past medical history who presents to this ED for evaluation of bilateral low back pain, patient notes that he works as a , was carrying some of his meal trays yesterday, developed sudden onset lower back pain.  No loss control of bowel or bladder.  No nausea or vomiting.  No numbness or tingling.  No red flag neurological signs.  He is good strength and sensation to his upper and lower extremities.  No dizziness, lightheadedness, previous history of back injuries.  Did take Tylenol yesterday, this did help a little bit with his pain, has not taken anything for pain today.  Presents to the ED as a pain became worse today. No other acute complaints.       REVIEW OF SYSTEMS   Review of Systems   Respiratory: Negative for chest tightness.    Cardiovascular: Negative for chest pain.   Gastrointestinal: Negative for abdominal pain.   Genitourinary: Negative  for dysuria, flank pain and hematuria.   Musculoskeletal: Positive for back pain.   Neurological: Negative for dizziness, weakness, light-headedness, numbness and headaches.   All other systems reviewed and are negative.      PAST MEDICAL HISTORY:  Past Medical History:   Diagnosis Date     Anemia      NO ACTIVE PROBLEMS      Uncomplicated asthma        PAST SURGICAL HISTORY:  Past Surgical History:   Procedure Laterality Date     NO HISTORY OF SURGERY             CURRENT MEDICATIONS:    cyclobenzaprine (FLEXERIL) 10 MG tablet  budesonide-formoterol (SYMBICORT) 80-4.5 MCG/ACT Inhaler         ALLERGIES:  Allergies   Allergen Reactions     Nka [No Known Allergies]        FAMILY HISTORY:  Family History   Problem Relation Age of Onset     Diabetes No family hx of      Coronary Artery Disease No family hx of      Hypertension No family hx of      Breast Cancer No family hx of      Ovarian Cancer No family hx of      Cancer - colorectal No family hx of      Prostate Cancer No family hx of      Depression/Anxiety No family hx of      Cerebrovascular Disease No family hx of      Asthma No family hx of        SOCIAL HISTORY:   Social History     Socioeconomic History     Marital status: Single   Tobacco Use     Smoking status: Never     Smokeless tobacco: Never   Substance and Sexual Activity     Alcohol use: Yes     Comment: Ocassionally, twice a month     Drug use: No     Sexual activity: Yes     Partners: Female       VITALS:  BP (!) 156/98   Pulse 72   Temp 98.7  F (37.1  C) (Oral)   Resp 16   Wt 97.5 kg (215 lb)   SpO2 99%   BMI 32.69 kg/m      PHYSICAL EXAM    Physical Exam  Vitals and nursing note reviewed.   Constitutional:       General: He is not in acute distress.     Appearance: Normal appearance. He is normal weight. He is not toxic-appearing or diaphoretic.   HENT:      Head: Normocephalic.      Right Ear: External ear normal.      Left Ear: External ear normal.   Cardiovascular:      Rate and Rhythm:  Normal rate and regular rhythm.      Heart sounds: Normal heart sounds. No murmur heard.    No friction rub. No gallop.   Pulmonary:      Effort: Pulmonary effort is normal. No respiratory distress.      Breath sounds: No wheezing.   Abdominal:      General: Abdomen is flat. Bowel sounds are normal. There is no distension.      Palpations: Abdomen is soft.      Tenderness: There is no abdominal tenderness. There is no right CVA tenderness, left CVA tenderness, guarding or rebound.   Musculoskeletal:         General: Tenderness present. No swelling or deformity.      Comments: Diffuse lower lumbar tenderness, bilaterally.  No midline tenderness.  No bony step-offs, or deformities.  No midline tenderness in the lumbar, or thoracic area.   Skin:     General: Skin is warm.   Neurological:      Mental Status: He is alert. Mental status is at baseline.      Motor: No weakness.        LAB:  All pertinent labs reviewed and interpreted.  Labs Ordered and Resulted from Time of ED Arrival to Time of ED Departure - No data to display    RADIOLOGY:  Reviewed all pertinent imaging. Please see official radiology report.  No orders to display     PROCEDURES:   None.     Hema Ferro PA-C  Emergency Medicine  [facility] EMERGENCY ROOM  [address] [facility] [id number] [phone]  Dept: [phone]     Hema Ferro PA-C  10/13/22 1533

## 2022-10-13 NOTE — ED NOTES
Pt agrees with discharge instructions. See providers notes for further assessment. No other questions at this time.

## 2022-10-13 NOTE — DISCHARGE INSTRUCTIONS
He was seen here in the emergency department for evaluation of low back pain.  I recommend he follow with your primary care provider.  Please take the below pain medications as needed.  Use the muscle relaxers for more back pain relief at night.  Please follow-up with your primary care doctor, return to emergency department develop any numbness or tingling in her lower extremities, loss control your bowel or bladder.  Suspect all musculoskeletal, likely a low back strain.

## 2022-10-13 NOTE — Clinical Note
Sukhdev Price was seen and treated in our emergency department on 10/13/2022.  He may return to work on 10/17/2022.       If you have any questions or concerns, please don't hesitate to call.      Hema Ferro PA-C

## 2022-10-13 NOTE — ED TRIAGE NOTES
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  He took OTC meds yesterday but none today.  Pain worsened today.     Triage Assessment     Row Name 10/13/22 1205       Triage Assessment (Adult)    Airway WDL WDL       Respiratory WDL    Respiratory WDL WDL       Skin Circulation/Temperature WDL    Skin Circulation/Temperature WDL WDL       Cardiac WDL    Cardiac WDL WDL       Peripheral/Neurovascular WDL    Peripheral Neurovascular WDL WDL       Cognitive/Neuro/Behavioral WDL    Cognitive/Neuro/Behavioral WDL WDL

## 2023-11-29 ENCOUNTER — OFFICE VISIT (OUTPATIENT)
Dept: FAMILY MEDICINE | Facility: CLINIC | Age: 33
End: 2023-11-29
Payer: COMMERCIAL

## 2023-11-29 VITALS
HEIGHT: 67 IN | SYSTOLIC BLOOD PRESSURE: 116 MMHG | OXYGEN SATURATION: 98 % | HEART RATE: 90 BPM | RESPIRATION RATE: 12 BRPM | BODY MASS INDEX: 31.37 KG/M2 | TEMPERATURE: 98.3 F | WEIGHT: 199.9 LBS | DIASTOLIC BLOOD PRESSURE: 90 MMHG

## 2023-11-29 DIAGNOSIS — E66.811 OBESITY (BMI 30.0-34.9): ICD-10-CM

## 2023-11-29 DIAGNOSIS — R03.0 ELEVATED BLOOD PRESSURE READING WITHOUT DIAGNOSIS OF HYPERTENSION: ICD-10-CM

## 2023-11-29 DIAGNOSIS — R10.11 RUQ ABDOMINAL PAIN: Primary | ICD-10-CM

## 2023-11-29 DIAGNOSIS — Z11.59 NEED FOR HEPATITIS C SCREENING TEST: ICD-10-CM

## 2023-11-29 DIAGNOSIS — Z11.4 SCREENING FOR HIV (HUMAN IMMUNODEFICIENCY VIRUS): ICD-10-CM

## 2023-11-29 LAB
ALBUMIN SERPL BCG-MCNC: 5.3 G/DL (ref 3.5–5.2)
ALP SERPL-CCNC: 55 U/L (ref 40–150)
ALT SERPL W P-5'-P-CCNC: 63 U/L (ref 0–70)
ANION GAP SERPL CALCULATED.3IONS-SCNC: 11 MMOL/L (ref 7–15)
AST SERPL W P-5'-P-CCNC: 42 U/L (ref 0–45)
BILIRUB SERPL-MCNC: 0.8 MG/DL
BUN SERPL-MCNC: 16.2 MG/DL (ref 6–20)
CALCIUM SERPL-MCNC: 10.8 MG/DL (ref 8.6–10)
CHLORIDE SERPL-SCNC: 101 MMOL/L (ref 98–107)
CHOLEST SERPL-MCNC: 263 MG/DL
CREAT SERPL-MCNC: 1.09 MG/DL (ref 0.67–1.17)
DEPRECATED HCO3 PLAS-SCNC: 28 MMOL/L (ref 22–29)
EGFRCR SERPLBLD CKD-EPI 2021: >90 ML/MIN/1.73M2
GLUCOSE SERPL-MCNC: 91 MG/DL (ref 70–99)
HDLC SERPL-MCNC: 47 MG/DL
LDLC SERPL CALC-MCNC: 168 MG/DL
NONHDLC SERPL-MCNC: 216 MG/DL
POTASSIUM SERPL-SCNC: 4.4 MMOL/L (ref 3.4–5.3)
PROT SERPL-MCNC: 8.5 G/DL (ref 6.4–8.3)
SODIUM SERPL-SCNC: 140 MMOL/L (ref 135–145)
TRIGL SERPL-MCNC: 241 MG/DL

## 2023-11-29 PROCEDURE — 36415 COLL VENOUS BLD VENIPUNCTURE: CPT | Performed by: STUDENT IN AN ORGANIZED HEALTH CARE EDUCATION/TRAINING PROGRAM

## 2023-11-29 PROCEDURE — 80061 LIPID PANEL: CPT | Performed by: STUDENT IN AN ORGANIZED HEALTH CARE EDUCATION/TRAINING PROGRAM

## 2023-11-29 PROCEDURE — 87389 HIV-1 AG W/HIV-1&-2 AB AG IA: CPT | Performed by: STUDENT IN AN ORGANIZED HEALTH CARE EDUCATION/TRAINING PROGRAM

## 2023-11-29 PROCEDURE — 80053 COMPREHEN METABOLIC PANEL: CPT | Performed by: STUDENT IN AN ORGANIZED HEALTH CARE EDUCATION/TRAINING PROGRAM

## 2023-11-29 PROCEDURE — 90715 TDAP VACCINE 7 YRS/> IM: CPT | Performed by: STUDENT IN AN ORGANIZED HEALTH CARE EDUCATION/TRAINING PROGRAM

## 2023-11-29 PROCEDURE — 99213 OFFICE O/P EST LOW 20 MIN: CPT | Mod: 25 | Performed by: STUDENT IN AN ORGANIZED HEALTH CARE EDUCATION/TRAINING PROGRAM

## 2023-11-29 PROCEDURE — 86803 HEPATITIS C AB TEST: CPT | Performed by: STUDENT IN AN ORGANIZED HEALTH CARE EDUCATION/TRAINING PROGRAM

## 2023-11-29 PROCEDURE — 90471 IMMUNIZATION ADMIN: CPT | Performed by: STUDENT IN AN ORGANIZED HEALTH CARE EDUCATION/TRAINING PROGRAM

## 2023-11-29 ASSESSMENT — ASTHMA QUESTIONNAIRES: ACT_TOTALSCORE: 23

## 2023-11-29 NOTE — PROGRESS NOTES
Assessment & Plan     RUQ abdominal pain  Mr. Price 3-year-old male who presents today for 2 days of right upper quadrant discomfort when lying on his right side only, there is no other time when he feels the discomfort, he hesitates to call it pain reporting that might be a 1 out of 10 on the pain scale.  The location is what bothered him and he thought that it might be a liver issue.  Has not had any recent trauma, does weight lift, did exert himself somewhat last week beyond his normal.  On examination he has no abnormal findings, no tenderness, negative hook test, negative Morrison's.    Unclear of what is causing the patient's pressure sensation/discomfort in the right upper quadrant.  Offered comprehensive metabolic panel to evaluate for liver injury.  Believe the most likely diagnosis is a lower rib pain syndrome given that it is only present when he lies on his right side, however there is no tenderness to the ribs and he did have a negative hook sign.  He will monitor for worsening pain and discomfort or any other symptoms and notify me if these occur.    - Comprehensive metabolic panel (BMP + Alb, Alk Phos, ALT, AST, Total. Bili, TP)    Obesity (BMI 30.0-34.9)  Has BMI over 30, recommend for lipid panel screening, he is fasting so I am also obtaining a diabetes screening through the comprehensive metabolic panel ordered above.  He is already active, would recommend for under 50 minutes of moderate to vigorous exercise weekly, 5 servings of vegetables and fruits daily.    - Lipid panel reflex to direct LDL Fasting  - Lipid panel reflex to direct LDL Fasting    Elevated blood pressure without diagnosis of hypertension    Has elevated blood pressure on initial and repeat blood pressures, on review of his vital signs in the past also has had elevated blood pressures on many other appointments.  Asked patient to keep a blood pressure log at home for 5 days and to send this in via [employer], I suspect presence of  "possible secondary hypertension given age, obstructive sleep apnea is possible.  We will follow-up with patient after he gives us these readings.    Screening for HIV (human immunodeficiency virus)    - HIV Antigen Antibody Combo  - HIV Antigen Antibody Combo    Need for hepatitis C screening test    - Hepatitis C Screen Reflex to HCV RNA Quant and Genotype  - Hepatitis C Screen Reflex to HCV RNA Quant and Genotype               BMI:   Estimated body mass index is 31.08 kg/m  as calculated from the following:    Height as of this encounter: 1.708 m (5' 7.25\").    Weight as of this encounter: 90.7 kg (199 lb 14.4 oz).   Weight management plan: Discussed healthy diet and exercise guidelines    Follow-up as needed for worsening pain or discomfort or any other symptoms that occur related to the pain.    Jose Eduardo Rodriguez MD  [facility]    Ruddy Santizo is a 33 year old, presenting for the following health issues:  Abdominal Pain (Upper right pain/discomfort when laying down on right side, noticed 2 days ago.)        11/29/2023     1:46 PM   Additional Questions   Roomed by Anila DUTTON       History of Present Illness       Reason for visit:  Ache feeling when i lie down on my right side, upper right abdomen or could be liver im guessing  Symptom onset:  1-3 days ago    He eats 0-1 servings of fruits and vegetables daily.He consumes 1 sweetened beverage(s) daily.He exercises with enough effort to increase his heart rate 60 or more minutes per day.  He exercises with enough effort to increase his heart rate 4 days per week.   He is taking medications regularly.     Has noticed for the past two days that if he lies on his right side there is a feeling of air in the right upper abdomen with discomfort.  It is feeling like something is aching under the rib cage. Does not hurt when not lying on his right side.  Is sleeping well, doing all of his normal activities. No fevers. No post prandial " "pain. No changes to skin overlying the area of pain.     On Lainey did have a four bottles of beer and did have more fatty diet than usual. Usually does not drink much, maybe once every couple of months.         Denies fever, nausea, emesis,diarrhea, constipation, melena, hematochezia, bloating, normal appetite. Denies recent trauma. Sometimes does do heavy lifting, goes about once a week, last time he did this was last before lainey.       Review of Systems   Constitutional, HEENT, cardiovascular, pulmonary, GI, , musculoskeletal, neuro, skin, endocrine and psych systems are negative, except as otherwise noted.      Objective    BP (!) 116/90   Pulse 90   Temp 98.3  F (36.8  C) (Oral)   Resp 12   Ht 1.708 m (5' 7.25\")   Wt 90.7 kg (199 lb 14.4 oz)   SpO2 98%   BMI 31.08 kg/m    Body mass index is 31.08 kg/m .  Physical Exam   GENERAL: healthy, alert and no distress  EYES: Eyes grossly normal to inspection, and conjunctivae and sclerae normal  RESP: lungs clear to auscultation - no rales, rhonchi or wheezes  CV: regular rate and rhythm, normal S1 S2, no S3 or S4, no murmur, click or rub, no peripheral edema and peripheral pulses strong  ABDOMEN: soft, nontender, no hepatosplenomegaly, no masses and bowel sounds normal, negative Morrison's, negative hook sign  MS: no gross musculoskeletal defects noted, no edema  SKIN: no suspicious lesions or rashes  NEURO: Normal strength and tone, mentation intact and speech normal  PSYCH: mentation appears normal, affect normal/bright                "

## 2023-11-30 LAB
HCV AB SERPL QL IA: NONREACTIVE
HIV 1+2 AB+HIV1 P24 AG SERPL QL IA: NONREACTIVE

## 2023-12-04 DIAGNOSIS — E78.2 MIXED HYPERLIPIDEMIA: ICD-10-CM

## 2023-12-04 DIAGNOSIS — E83.52 HYPERCALCEMIA: Primary | ICD-10-CM

## 2023-12-09 ENCOUNTER — HEALTH MAINTENANCE LETTER (OUTPATIENT)
Age: 33
End: 2023-12-09

## 2023-12-15 ENCOUNTER — OFFICE VISIT (OUTPATIENT)
Dept: FAMILY MEDICINE | Facility: CLINIC | Age: 33
End: 2023-12-15
Payer: COMMERCIAL

## 2023-12-15 VITALS
WEIGHT: 201 LBS | SYSTOLIC BLOOD PRESSURE: 122 MMHG | TEMPERATURE: 97.3 F | OXYGEN SATURATION: 100 % | HEIGHT: 68 IN | DIASTOLIC BLOOD PRESSURE: 70 MMHG | BODY MASS INDEX: 30.46 KG/M2 | HEART RATE: 74 BPM | RESPIRATION RATE: 16 BRPM

## 2023-12-15 DIAGNOSIS — S39.012A STRAIN OF LUMBAR REGION, INITIAL ENCOUNTER: Primary | ICD-10-CM

## 2023-12-15 PROCEDURE — 99213 OFFICE O/P EST LOW 20 MIN: CPT | Performed by: NURSE PRACTITIONER

## 2023-12-15 RX ORDER — NAPROXEN 500 MG/1
500 TABLET ORAL 2 TIMES DAILY PRN
Qty: 30 TABLET | Refills: 0 | Status: SHIPPED | OUTPATIENT
Start: 2023-12-15 | End: 2023-12-20

## 2023-12-15 RX ORDER — CYCLOBENZAPRINE HCL 10 MG
10 TABLET ORAL 3 TIMES DAILY PRN
Qty: 14 TABLET | Refills: 0 | Status: SHIPPED | OUTPATIENT
Start: 2023-12-15

## 2023-12-15 ASSESSMENT — ENCOUNTER SYMPTOMS: BACK PAIN: 1

## 2023-12-15 NOTE — LETTER
December 15, 2023      Sukhdev Price  [address] [place] [address]        To Whom It May Concern:    Sukhdev Price  was seen on 12/15/2023.  Please excuse him until 12/18/2023 due to injury.  He is able return to work without restrictions.       Sincerely,        DEBORAH Crow CNP

## 2023-12-15 NOTE — PROGRESS NOTES
"  Assessment & Plan     Strain of lumbar region, initial encounter  No red flags on exam; conservative therapy  - cyclobenzaprine (FLEXERIL) 10 MG tablet  Dispense: 14 tablet; Refill: 0  - Physical Therapy Referral  - provide work note                 DEBORAH Crwo CNP  [facility]    Ruddy Santizo is a 33 year old, presenting for the following health issues:  Back Pain      12/15/2023     1:33 PM   Additional Questions   Roomed by Keerthi Benitez MA       33 year old  year old male with [facility]   Patient Active Problem List   Diagnosis Code    Reactive airway disease with wheezing, mild persistent, uncomplicated J45.30     in clinic for acute office visit related to/establish care/to discuss       - lower back pain burning/dull pain to lower back; denies n/t; no changes to Bb  Massage help feeling better today; works as postal service       Back Pain     History of Present Illness       Back Pain:  He presents for follow up of back pain. Patient's back pain is a recurring problem.  Location of back pain:  Right lower back and left lower back  Description of back pain: burning, cramping and dull ache  Back pain spreads: nowhere    Since patient first noticed back pain, pain is: gradually improving  Does back pain interfere with his job:  Yes       He eats 0-1 servings of fruits and vegetables daily.He consumes 1 sweetened beverage(s) daily.He exercises with enough effort to increase his heart rate 60 or more minutes per day.  He exercises with enough effort to increase his heart rate 4 days per week.   He is taking medications regularly.                 Review of Systems   Musculoskeletal:  Positive for back pain.            Objective    /70   Pulse 74   Temp 97.3  F (36.3  C) (Temporal)   Resp 16   Ht 1.715 m (5' 7.5\")   Wt 91.2 kg (201 lb)   SpO2 100%   BMI 31.02 kg/m    Body mass index is 31.02 kg/m .  Physical Exam                         "

## 2023-12-20 ENCOUNTER — TELEPHONE (OUTPATIENT)
Dept: FAMILY MEDICINE | Facility: CLINIC | Age: 33
End: 2023-12-20
Payer: COMMERCIAL

## 2023-12-20 DIAGNOSIS — S39.012A STRAIN OF LUMBAR REGION, INITIAL ENCOUNTER: ICD-10-CM

## 2023-12-20 RX ORDER — NAPROXEN 500 MG/1
500 TABLET ORAL 2 TIMES DAILY PRN
Qty: 30 TABLET | Refills: 0 | Status: SHIPPED | OUTPATIENT
Start: 2023-12-20

## 2023-12-20 RX ORDER — NAPROXEN 500 MG/1
500 TABLET ORAL 2 TIMES DAILY PRN
Qty: 30 TABLET | Refills: 0 | OUTPATIENT
Start: 2023-12-20

## 2023-12-20 NOTE — LETTER
December 20, 2023      Sukhdev Price  [address] [place] [address]        To Whom It May Concern:        Sukhdev Price  was seen on 12/15/2023.  Please excuse him until 12/25/2023 due to injury.  He will return to work on 12/26/23.  He is able return to work without restrictions.       Sincerely,        DEBORAH Crow CNP

## 2023-12-20 NOTE — TELEPHONE ENCOUNTER
Patient is calling and hoping to get an extension on their work excuse letter to excuse them from work until 12/25 with return to work on 12/26. Their back pain is not being resolved per patient and still hurts.

## 2023-12-20 NOTE — TELEPHONE ENCOUNTER
Pended new letter.   Updated the previous letter.  Sending to Yuri Dobbs NP.  From 12/15/23 JOHNNY Mcdonald RN

## 2023-12-20 NOTE — TELEPHONE ENCOUNTER
Was sent to refill pool but was denied. Pharmacy never got previous script so needs a new script of naproxen sent

## 2023-12-20 NOTE — TELEPHONE ENCOUNTER
Patient states that pharmacy did not receive the naproxen prescription and wishes to have a new script sent to the [facility].

## 2023-12-21 ENCOUNTER — MYC MEDICAL ADVICE (OUTPATIENT)
Dept: FAMILY MEDICINE | Facility: CLINIC | Age: 33
End: 2023-12-21
Payer: COMMERCIAL

## 2023-12-21 NOTE — TELEPHONE ENCOUNTER
Pt sent MyChart today to check status.  Sending high priority.    Eden LO RN  [facility]

## 2023-12-22 NOTE — TELEPHONE ENCOUNTER
Called patient. No answer. LVM & sent MyChart that new work letter is written and sent to patient in MyChart.     Destiny Jones RN  [facility]

## 2024-04-11 ENCOUNTER — MYC REFILL (OUTPATIENT)
Dept: FAMILY MEDICINE | Facility: CLINIC | Age: 34
End: 2024-04-11
Payer: COMMERCIAL

## 2024-04-11 DIAGNOSIS — J45.30 MILD PERSISTENT ASTHMA WITHOUT COMPLICATION: ICD-10-CM

## 2024-04-12 RX ORDER — BUDESONIDE AND FORMOTEROL FUMARATE DIHYDRATE 80; 4.5 UG/1; UG/1
2 AEROSOL RESPIRATORY (INHALATION)
Qty: 10.2 G | Refills: 3 | Status: SHIPPED | OUTPATIENT
Start: 2024-04-12

## 2024-12-06 ENCOUNTER — TELEPHONE (OUTPATIENT)
Dept: FAMILY MEDICINE | Facility: CLINIC | Age: 34
End: 2024-12-06
Payer: COMMERCIAL

## 2025-01-28 ENCOUNTER — OFFICE VISIT (OUTPATIENT)
Dept: FAMILY MEDICINE | Facility: CLINIC | Age: 35
End: 2025-01-28
Payer: COMMERCIAL

## 2025-01-28 VITALS
HEART RATE: 62 BPM | WEIGHT: 191.8 LBS | SYSTOLIC BLOOD PRESSURE: 110 MMHG | HEIGHT: 67 IN | DIASTOLIC BLOOD PRESSURE: 70 MMHG | TEMPERATURE: 97.5 F | RESPIRATION RATE: 14 BRPM | OXYGEN SATURATION: 98 % | BODY MASS INDEX: 30.1 KG/M2

## 2025-01-28 DIAGNOSIS — J45.30 MILD PERSISTENT ASTHMA WITHOUT COMPLICATION: ICD-10-CM

## 2025-01-28 DIAGNOSIS — E78.2 MIXED HYPERLIPIDEMIA: ICD-10-CM

## 2025-01-28 DIAGNOSIS — E83.52 HYPERCALCEMIA: ICD-10-CM

## 2025-01-28 DIAGNOSIS — Z00.00 ROUTINE GENERAL MEDICAL EXAMINATION AT A HEALTH CARE FACILITY: Primary | ICD-10-CM

## 2025-01-28 DIAGNOSIS — E66.811 CLASS 1 OBESITY: ICD-10-CM

## 2025-01-28 PROCEDURE — 99213 OFFICE O/P EST LOW 20 MIN: CPT | Mod: 25 | Performed by: STUDENT IN AN ORGANIZED HEALTH CARE EDUCATION/TRAINING PROGRAM

## 2025-01-28 PROCEDURE — G2211 COMPLEX E/M VISIT ADD ON: HCPCS | Performed by: STUDENT IN AN ORGANIZED HEALTH CARE EDUCATION/TRAINING PROGRAM

## 2025-01-28 PROCEDURE — 99395 PREV VISIT EST AGE 18-39: CPT | Performed by: STUDENT IN AN ORGANIZED HEALTH CARE EDUCATION/TRAINING PROGRAM

## 2025-01-28 RX ORDER — BUDESONIDE AND FORMOTEROL FUMARATE DIHYDRATE 80; 4.5 UG/1; UG/1
2 AEROSOL RESPIRATORY (INHALATION)
Qty: 60 G | Refills: 11 | Status: SHIPPED | OUTPATIENT
Start: 2025-01-28

## 2025-01-28 SDOH — HEALTH STABILITY: PHYSICAL HEALTH: ON AVERAGE, HOW MANY DAYS PER WEEK DO YOU ENGAGE IN MODERATE TO STRENUOUS EXERCISE (LIKE A BRISK WALK)?: 5 DAYS

## 2025-01-28 SDOH — HEALTH STABILITY: PHYSICAL HEALTH: ON AVERAGE, HOW MANY MINUTES DO YOU ENGAGE IN EXERCISE AT THIS LEVEL?: 60 MIN

## 2025-01-28 ASSESSMENT — SOCIAL DETERMINANTS OF HEALTH (SDOH): HOW OFTEN DO YOU GET TOGETHER WITH FRIENDS OR RELATIVES?: ONCE A WEEK

## 2025-01-28 NOTE — LETTER
My Asthma Action Plan    Name: Sukhdev Price   YOB: 1990  Date: 1/28/2025   My doctor: Jose Eduardo Rodriguez MD   My clinic: [facility]        My Control Medicine: Budesonide + formoterol (Symbicort HFA) -  80/4.5 mcg    My Rescue Medicine:  Symbicort   My Asthma Severity:   Mild Persistent  Know your asthma triggers: dust mites, pollens, and animal dander               GREEN ZONE   Good Control  I feel good  No cough or wheeze  Can work, sleep and play without asthma symptoms       Take your asthma control medicine every day.     If exercise triggers your asthma, take your rescue medication  15 minutes before exercise or sports, and  During exercise if you have asthma symptoms  Spacer to use with inhaler: If you have a spacer, make sure to use it with your inhaler             YELLOW ZONE Getting Worse  I have ANY of these:  I do not feel good  Cough or wheeze  Chest feels tight  Wake up at night   Keep taking your Green Zone medications  Start taking your rescue medicine:  every 20 minutes for up to 1 hour. Then every 4 hours for 24-48 hours.  If you stay in the Yellow Zone for more than 12-24 hours, contact your doctor.  If you do not return to the Green Zone in 12-24 hours or you get worse, start taking your oral steroid medicine if prescribed by your provider.           RED ZONE Medical Alert - Get Help  I have ANY of these:  I feel awful  Medicine is not helping  Breathing getting harder  Trouble walking or talking  Nose opens wide to breathe       Take your rescue medicine NOW  If your provider has prescribed an oral steroid medicine, start taking it NOW  Call your doctor NOW  If you are still in the Red Zone after 20 minutes and you have not reached your doctor:  Take your rescue medicine again and  Call 911 or go to the emergency room right away    See your regular doctor within 2 weeks of an Emergency Room or Urgent Care visit for follow-up treatment.          Annual  Reminders:  Meet with Asthma Educator,  Flu Shot in the Fall, consider Pneumonia Vaccination for patients with asthma (aged 19 and older).    Pharmacy: [id number] - [place], [place] - [address]    Electronically signed by Jose Eduardo Rodriguez MD   Date: 01/28/25                      Asthma Triggers  How To Control Things That Make Your Asthma Worse    Triggers are things that make your asthma worse.  Look at the list below to help you find your triggers and what you can do about them.  You can help prevent asthma flare-ups by staying away from your triggers.      Trigger                                                          What you can do   Cigarette Smoke  Tobacco smoke can make asthma worse. Do not allow smoking in your home, car or around you.  Be sure no one smokes at a child s day care or school.  If you smoke, ask your health care provider for ways to help you quit.  Ask family members to quit too.  Ask your health care provider for a referral to Quit Plan to help you quit smoking, or call 1-964-188-PLAN.     Colds, Flu, Bronchitis  These are common triggers of asthma. Wash your hands often.  Don t touch your eyes, nose or mouth.  Get a flu shot every year.     Dust Mites  These are tiny bugs that live in cloth or carpet. They are too small to see. Wash sheets and blankets in hot water every week.   Encase pillows and mattress in dust mite proof covers.  Avoid having carpet if you can. If you have carpet, vacuum weekly.   Use a dust mask and HEPA vacuum.   Pollen and Outdoor Mold  Some people are allergic to trees, grass, or weed pollen, or molds. Try to keep your windows closed.  Limit time out doors when pollen count is high.   Ask you health care provider about taking medicine during allergy season.     Animal Dander  Some people are allergic to skin flakes, urine or saliva from pets with fur or feathers. Keep pets with fur or feathers out of your home.    If you can t keep the pet outdoors,  then keep the pet out of your bedroom.  Keep the bedroom door closed.  Keep pets off cloth furniture and away from stuffed toys.     Mice, Rats, and Cockroaches   Some people are allergic to the waste from these pests.   Cover food and garbage.  Clean up spills and food crumbs.  Store grease in the refrigerator.   Keep food out of the bedroom.   Indoor Mold  This can be a trigger if your home has high moisture. Fix leaking faucets, pipes, or other sources of water.   Clean moldy surfaces.  Dehumidify basement if it is damp and smelly.   Smoke, Strong Odors, and Sprays  These can reduce air quality. Stay away from strong odors and sprays, such as perfume, powder, hair spray, paints, smoke incense, paint, cleaning products, candles and new carpet.   Exercise or Sports  Some people with asthma have this trigger. Be active!  Ask your doctor about taking medicine before sports or exercise to prevent symptoms.    Warm up for 5-10 minutes before and after sports or exercise.     Other Triggers of Asthma  Cold air:  Cover your nose and mouth with a scarf.  Sometimes laughing or crying can be a trigger.  Some medicines and food can trigger asthma.

## 2025-01-28 NOTE — PATIENT INSTRUCTIONS
Patient Education   Preventive Care Advice   This is general advice given by our system to help you stay healthy. However, your care team may have specific advice just for you. Please talk to your care team about your preventive care needs.  Nutrition  Eat 5 or more servings of fruits and vegetables each day.  Try wheat bread, brown rice and whole grain pasta (instead of white bread, rice, and pasta).  Get enough calcium and vitamin D. Check the label on foods and aim for 100% of the RDA (recommended daily allowance).  Lifestyle  Exercise at least 150 minutes each week  (30 minutes a day, 5 days a week).  Do muscle strengthening activities 2 days a week. These help control your weight and prevent disease.  No smoking.  Wear sunscreen to prevent skin cancer.  Have a dental exam and cleaning every 6 months.  Yearly exams  See your health care team every year to talk about:  Any changes in your health.  Any medicines your care team has prescribed.  Preventive care, family planning, and ways to prevent chronic diseases.  Shots (vaccines)   HPV shots (up to age 26), if you've never had them before.  Hepatitis B shots (up to age 59), if you've never had them before.  COVID-19 shot: Get this shot when it's due.  Flu shot: Get a flu shot every year.  Tetanus shot: Get a tetanus shot every 10 years.  Pneumococcal, hepatitis A, and RSV shots: Ask your care team if you need these based on your risk.  Shingles shot (for age 50 and up)  General health tests  Diabetes screening:  Starting at age 35, Get screened for diabetes at least every 3 years.  If you are younger than age 35, ask your care team if you should be screened for diabetes.  Cholesterol test: At age 39, start having a cholesterol test every 5 years, or more often if advised.  Bone density scan (DEXA): At age 50, ask your care team if you should have this scan for osteoporosis (brittle bones).  Hepatitis C: Get tested at least once in your life.  STIs (sexually  transmitted infections)  Before age 24: Ask your care team if you should be screened for STIs.  After age 24: Get screened for STIs if you're at risk. You are at risk for STIs (including HIV) if:  You are sexually active with more than one person.  You don't use condoms every time.  You or a partner was diagnosed with a sexually transmitted infection.  If you are at risk for HIV, ask about PrEP medicine to prevent HIV.  Get tested for HIV at least once in your life, whether you are at risk for HIV or not.  Cancer screening tests  Cervical cancer screening: If you have a cervix, begin getting regular cervical cancer screening tests starting at age 21.  Breast cancer scan (mammogram): If you've ever had breasts, begin having regular mammograms starting at age 40. This is a scan to check for breast cancer.  Colon cancer screening: It is important to start screening for colon cancer at age 45.  Have a colonoscopy test every 10 years (or more often if you're at risk) Or, ask your provider about stool tests like a FIT test every year or Cologuard test every 3 years.  To learn more about your testing options, visit:   .  For help making a decision, visit:   https://bit.ly/zc60694.  Prostate cancer screening test: If you have a prostate, ask your care team if a prostate cancer screening test (PSA) at age 55 is right for you.  Lung cancer screening: If you are a current or former smoker ages 50 to 80, ask your care team if ongoing lung cancer screenings are right for you.  For informational purposes only. Not to replace the advice of your health care provider. Copyright   2023 [employer]. All rights reserved. Clinically reviewed by the [facility] Transitions Program. "[employer] 507123 - REV 01/24.

## 2025-01-28 NOTE — PROGRESS NOTES
Preventive Care Visit  [facility]  Jose Eduardo Rodriguez MD, Family Medicine  Jan 28, 2025      Assessment & Plan     Routine general medical examination at a health care facility  Sukhdev is a 34-year-old male presenting today for adult preventative exam.  He has a history of mild persistent asthma on Symbicort, hyperlipidemia.    Reports that he has been in good health.  His vital signs today are normal, examination today normal.    Did recommend for pneumococcal vaccination given his history of asthma, he declines.  Also recommended for COVID and influenza vaccination which he declines as well.    Mild persistent asthma without complication    Has been well-controlled on Symbicort, not using daily, but only when needed.  Still able to do this without any exacerbations over the next year, could consider de-escalating to albuterol, although patient reports albuterol previously was not enough.    - budesonide-formoterol (SYMBICORT/BREYNA) 80-4.5 MCG/ACT Inhaler  Dispense: 60 g; Refill: 11    Mixed hyperlipidemia  Has hyperlipidemia, we discussed lifestyle management with increasing fruits and vegetables to 5 servings daily as well as 150 minutes exercise weekly.  He is a [employer] carrier, which does allow him to get a lot of exercise during the day as well.    Recommended for repeat lipid panel, he declines, would be agreeable to doing the next year.    Hypercalcemia  Had mild hypercalcemia on his last test, 10.8 in 2023.  Do recommend that we repeat at this time to evaluate for persistent hypercalcemia, explained reasoning to him but he declines.    Currently not symptomatic for hypercalcemia, we will readdress at our next visit.        The longitudinal plan of care for the diagnosis(es)/condition(s) as documented were addressed during this visit. Due to the added complexity in care, I will continue to support Sukhdev in the subsequent management and with ongoing continuity of care.        BMI  Estimated body  "mass index is 30.04 kg/m  as calculated from the following:    Height as of this encounter: 1.702 m (5' 7\").    Weight as of this encounter: 87 kg (191 lb 12.8 oz).   Weight management plan: Discussed healthy diet and exercise guidelines    Counseling  Appropriate preventive services were addressed with this patient via screening, questionnaire, or discussion as appropriate for fall prevention, nutrition, physical activity, Tobacco-use cessation, social engagement, weight loss and cognition.  Checklist reviewing preventive services available has been given to the patient.  Reviewed patient's diet, addressing concerns and/or questions.           Ruddy Santizo is a 34 year old, presenting for the following:  Physical (Fasting.)        1/28/2025     1:10 PM   Additional Questions   Roomed by MELBA WILD      Asthma Follow-Up    Was ACT completed today?  Yes        12/12/2024    10:59 PM   ACT Total Scores   ACT TOTAL SCORE (Goal Greater than or Equal to 20) 21    In the past 12 months, how many times did you visit the emergency room for your asthma without being admitted to the hospital? 0   In the past 12 months, how many times were you hospitalized overnight because of your asthma? 0       Patient-reported        How many days per week do you miss taking your asthma controller medication?  6  Please describe any recent triggers for your asthma: dust mites, pollens, and animal dander  Have you had any Emergency Room Visits, Urgent Care Visits, or Hospital Admissions since your last office visit?  No    Health Care Directive  Patient does not have a Health Care Directive: Discussed advance care planning with patient; information given to patient to review.      1/28/2025   General Health   How would you rate your overall physical health? Good   Feel stress (tense, anxious, or unable to sleep) Not at all         1/28/2025   Nutrition   Three or more servings of calcium each day? Yes   Diet: Breakfast skipped "   How many servings of fruit and vegetables per day? (!) 0-1   How many sweetened beverages each day? 0-1         1/28/2025   Exercise   Days per week of moderate/strenous exercise 5 days   Average minutes spent exercising at this level 60 min         1/28/2025   Social Factors   Frequency of gathering with friends or relatives Once a week   Worry food won't last until get money to buy more No   Food not last or not have enough money for food? No   Do you have housing? (Housing is defined as stable permanent housing and does not include staying ouside in a car, in a tent, in an abandoned building, in an overnight shelter, or couch-surfing.) No   Are you worried about losing your housing? No   Lack of transportation? No   Unable to get utilities (heat,electricity)? No   Want help with housing or utility concern? No   (!) HOUSING CONCERN PRESENT      1/28/2025   Dental   Dentist two times every year? Yes         1/28/2025   TB Screening   Were you born outside of the [place]? No         Today's PHQ-2 Score:       1/28/2025     1:04 PM   PHQ-2 ( 1999 [employer])   Q1: Little interest or pleasure in doing things 0   Q2: Feeling down, depressed or hopeless 0   PHQ-2 Score 0    Q1: Little interest or pleasure in doing things Not at all   Q2: Feeling down, depressed or hopeless Not at all   PHQ-2 Score 0       Patient-reported           1/28/2025   Substance Use   Alcohol more than 3/day or more than 7/wk No   Do you use any other substances recreationally? No     Social History     Tobacco Use    Smoking status: Never     Passive exposure: Never    Smokeless tobacco: Never   Vaping Use    Vaping status: Never Used   Substance Use Topics    Alcohol use: Yes     Comment: Ocassionally, twice a month    Drug use: No           1/28/2025   STI Screening   New sexual partner(s) since last STI/HIV test? No         1/28/2025   Contraception/Family Planning   Questions about contraception or family planning No        Reviewed and updated as  needed this visit by Provider   Tobacco  Allergies  Meds  Problems  Med Hx  Surg Hx  Fam Hx  Soc   Hx Sexual Activity          Past Medical History:   Diagnosis Date    Anemia     NO ACTIVE PROBLEMS     Uncomplicated asthma      Past Surgical History:   Procedure Laterality Date    NO HISTORY OF SURGERY       Labs reviewed in EPIC  BP Readings from Last 3 Encounters:   01/28/25 110/70   12/15/23 122/70   11/29/23 (!) 116/90    Wt Readings from Last 3 Encounters:   01/28/25 87 kg (191 lb 12.8 oz)   12/15/23 91.2 kg (201 lb)   11/29/23 90.7 kg (199 lb 14.4 oz)                  Patient Active Problem List   Diagnosis   (none) - all problems resolved or deleted     Past Surgical History:   Procedure Laterality Date    NO HISTORY OF SURGERY         Social History     Tobacco Use    Smoking status: Never     Passive exposure: Never    Smokeless tobacco: Never   Substance Use Topics    Alcohol use: Not Currently     Comment: Ocassionally, twice a month     Family History   Problem Relation Age of Onset    No Known Problems Mother     No Known Problems Father     No Known Problems Sister     No Known Problems Brother     No Known Problems Maternal Grandmother     No Known Problems Maternal Grandfather     No Known Problems Paternal Grandmother     No Known Problems Paternal Grandfather     Diabetes No family hx of     Coronary Artery Disease No family hx of     Hypertension No family hx of     Breast Cancer No family hx of     Ovarian Cancer No family hx of     Cancer - colorectal No family hx of     Prostate Cancer No family hx of     Depression/Anxiety No family hx of     Cerebrovascular Disease No family hx of     Asthma No family hx of          Current Outpatient Medications   Medication Sig Dispense Refill    budesonide-formoterol (SYMBICORT/BREYNA) 80-4.5 MCG/ACT Inhaler Inhale 2 puffs into the lungs every 2 hours as needed (shortness of breath, wheezing). 60 g 11     Allergies   Allergen Reactions    Nka [No  "Known Allergies]      Recent Labs   Lab Test 11/29/23  1437   *   HDL 47   TRIG 241*   ALT 63   CR 1.09   GFRESTIMATED >90   POTASSIUM 4.4          Review of Systems  Constitutional, neuro, ENT, endocrine, pulmonary, cardiac, gastrointestinal, genitourinary, musculoskeletal, integument and psychiatric systems are negative, except as otherwise noted.     Objective    Exam  /70 (BP Location: Right arm, Patient Position: Sitting, Cuff Size: Adult Large)   Pulse 62   Temp 97.5  F (36.4  C)   Resp 14   Ht 1.702 m (5' 7\")   Wt 87 kg (191 lb 12.8 oz)   SpO2 98%   BMI 30.04 kg/m     Estimated body mass index is 30.04 kg/m  as calculated from the following:    Height as of this encounter: 1.702 m (5' 7\").    Weight as of this encounter: 87 kg (191 lb 12.8 oz).    Physical Exam  GENERAL: alert and no distress  EYES: Eyes grossly normal to inspection, PERRL and conjunctivae and sclerae normal  HENT: ear canals and TM's normal, nose and mouth without ulcers or lesions  NECK: no adenopathy, no asymmetry, masses, or scars  RESP: lungs clear to auscultation - no rales, rhonchi or wheezes  CV: regular rate and rhythm, normal S1 S2, no S3 or S4, no murmur, click or rub, no peripheral edema  ABDOMEN: soft, nontender, no hepatosplenomegaly, no masses and bowel sounds normal  MS: no gross musculoskeletal defects noted, no edema  SKIN: no suspicious lesions or rashes  NEURO: Normal strength and tone, mentation intact and speech normal  PSYCH: mentation appears normal, affect normal/bright        Signed Electronically by: Jose Eduardo Rodriguez MD    "

## 2025-02-24 ENCOUNTER — OFFICE VISIT (OUTPATIENT)
Dept: FAMILY MEDICINE | Facility: CLINIC | Age: 35
End: 2025-02-24
Payer: COMMERCIAL

## 2025-02-24 VITALS
OXYGEN SATURATION: 98 % | TEMPERATURE: 97.8 F | DIASTOLIC BLOOD PRESSURE: 82 MMHG | HEIGHT: 68 IN | HEART RATE: 63 BPM | BODY MASS INDEX: 29.1 KG/M2 | RESPIRATION RATE: 18 BRPM | SYSTOLIC BLOOD PRESSURE: 127 MMHG | WEIGHT: 192 LBS

## 2025-02-24 DIAGNOSIS — Z11.3 SCREEN FOR STD (SEXUALLY TRANSMITTED DISEASE): ICD-10-CM

## 2025-02-24 DIAGNOSIS — K92.1 BLOOD IN STOOL: Primary | ICD-10-CM

## 2025-02-24 LAB
C TRACH DNA SPEC QL NAA+PROBE: NEGATIVE
HCV AB SERPL QL IA: NONREACTIVE
HIV 1+2 AB+HIV1 P24 AG SERPL QL IA: NONREACTIVE
HSV1 IGG SERPL QL IA: 0.37 INDEX
HSV1 IGG SERPL QL IA: NORMAL
HSV2 IGG SERPL QL IA: 0.05 INDEX
HSV2 IGG SERPL QL IA: NORMAL
N GONORRHOEA DNA SPEC QL NAA+PROBE: NEGATIVE
SPECIMEN TYPE: NORMAL
SPECIMEN TYPE: NORMAL
T PALLIDUM AB SER QL: NONREACTIVE

## 2025-02-24 PROCEDURE — 86696 HERPES SIMPLEX TYPE 2 TEST: CPT | Performed by: PHYSICIAN ASSISTANT

## 2025-02-24 PROCEDURE — 36415 COLL VENOUS BLD VENIPUNCTURE: CPT | Performed by: PHYSICIAN ASSISTANT

## 2025-02-24 PROCEDURE — 87591 N.GONORRHOEAE DNA AMP PROB: CPT | Performed by: PHYSICIAN ASSISTANT

## 2025-02-24 PROCEDURE — 86780 TREPONEMA PALLIDUM: CPT | Performed by: PHYSICIAN ASSISTANT

## 2025-02-24 PROCEDURE — 99214 OFFICE O/P EST MOD 30 MIN: CPT | Performed by: PHYSICIAN ASSISTANT

## 2025-02-24 PROCEDURE — 87491 CHLMYD TRACH DNA AMP PROBE: CPT | Performed by: PHYSICIAN ASSISTANT

## 2025-02-24 PROCEDURE — 86803 HEPATITIS C AB TEST: CPT | Performed by: PHYSICIAN ASSISTANT

## 2025-02-24 PROCEDURE — 87389 HIV-1 AG W/HIV-1&-2 AB AG IA: CPT | Performed by: PHYSICIAN ASSISTANT

## 2025-02-24 PROCEDURE — 86695 HERPES SIMPLEX TYPE 1 TEST: CPT | Performed by: PHYSICIAN ASSISTANT

## 2025-02-24 SDOH — HEALTH STABILITY: PHYSICAL HEALTH: ON AVERAGE, HOW MANY MINUTES DO YOU ENGAGE IN EXERCISE AT THIS LEVEL?: 70 MIN

## 2025-02-24 SDOH — HEALTH STABILITY: PHYSICAL HEALTH: ON AVERAGE, HOW MANY DAYS PER WEEK DO YOU ENGAGE IN MODERATE TO STRENUOUS EXERCISE (LIKE A BRISK WALK)?: 4 DAYS

## 2025-02-24 ASSESSMENT — SOCIAL DETERMINANTS OF HEALTH (SDOH): HOW OFTEN DO YOU GET TOGETHER WITH FRIENDS OR RELATIVES?: TWICE A WEEK

## 2025-02-24 ASSESSMENT — ENCOUNTER SYMPTOMS: HEMATOCHEZIA: 1

## 2025-04-01 ENCOUNTER — TRANSFERRED RECORDS (OUTPATIENT)
Dept: LAB | Facility: CLINIC | Age: 35
End: 2025-04-01
Payer: COMMERCIAL

## 2025-04-01 NOTE — PROCEDURES
[facility]   [address], [address], [place] [address]     Patient Name: Sukhdev Price  Gender:   Male  Exam Date: 04/01/2025 Visit Number:   [id number]  Age: 34 Years 11 Months YOB: 1990  Attending MD: Gabe Nuñez MD Medical Record#:   [id number]  -----------------------------------------------------------------------------------------------------------------------------   Procedure: Colonoscopy   Indications: Hematochezia  Referring MD: Lindsay ALFONSO  Primary MD:      Jose Eduardo Rodriguez MD   Medications: Admitting Medications:   0.9% Normal Saline at [facility]   Intra Procedure Medications:   Patient received monitored anesthesia care.     Complications: No immediate complications  ______________________________________________________________________________  Procedure:   An examination of the heart and lungs was performed and found to be within acceptable limits.  The patient was therefore deemed a reasonable candidate for endoscopy and monitored anesthesia care.   The risks, benefits and plan of the procedure were discussed with the patient and/or patient representative and all questions were answered.  After obtaining informed consent, the patient received monitored anesthesia care and I passed the scope   without difficulty via the rectum to the ileum.  The appendiceal orifice and ic valve were identified.  The scope was retroflexed during the examination  The quality of the prep was excellent  (Miralax/Gatorade/2 tablets Bisacodyl/Magnesium Citrate).    This was a complete examination throughout the entire colon.    Findings:    Normal finding.  Location - ileum.    Hemorrhoids.  External hemorrhoids without bleeding.    Remainder of the exam is normal.  Comments: In the rectum there were couple benign invaginated areas almost like diverticuli.  These nearly completely flattened out and are likely not significant.    Impression:  Hemorrhoids, external     Plan  Repeat  colonoscopy at age 45.    We will attempt to contact you at appropriate intervals via [place] mail.  We may not be able to find you or contact you at that time, therefore you should know that the responsibility for following our recommendation rests with you.  If you don't hear from us at the time your procedure is due, please contact our office to schedule an appointment.  If your contact information should change, please contact our office so that we can update your records.        Electronically signed by:___________________  Gabe Nuñez MD                 04/01/2025    Allergies:  Medication Name Ingredient Reaction Comment    NO KNOWN ALLERGIES       Vital Signs:  Date Time Systolic Diastolic Height Weight BMI   04/01/2025 7:26  97 68 in 193.00 29.30     Race:    Preferred Language:  English      cc: Jose Eduardo Rodriguez MD  cc: Lindsay ALFONSO        [facility] [phone]

## 2025-06-23 ENCOUNTER — OFFICE VISIT (OUTPATIENT)
Dept: FAMILY MEDICINE | Facility: CLINIC | Age: 35
End: 2025-06-23
Payer: COMMERCIAL

## 2025-06-23 VITALS
OXYGEN SATURATION: 97 % | SYSTOLIC BLOOD PRESSURE: 138 MMHG | HEART RATE: 63 BPM | HEIGHT: 68 IN | BODY MASS INDEX: 29.77 KG/M2 | RESPIRATION RATE: 22 BRPM | WEIGHT: 196.4 LBS | DIASTOLIC BLOOD PRESSURE: 81 MMHG | TEMPERATURE: 98.6 F

## 2025-06-23 DIAGNOSIS — H81.11 BENIGN PAROXYSMAL POSITIONAL VERTIGO OF RIGHT EAR: ICD-10-CM

## 2025-06-23 DIAGNOSIS — H65.91 OME (OTITIS MEDIA WITH EFFUSION), RIGHT: ICD-10-CM

## 2025-06-23 DIAGNOSIS — M54.41 ACUTE RIGHT-SIDED LOW BACK PAIN WITH RIGHT-SIDED SCIATICA: Primary | ICD-10-CM

## 2025-06-23 PROCEDURE — 3079F DIAST BP 80-89 MM HG: CPT | Performed by: NURSE PRACTITIONER

## 2025-06-23 PROCEDURE — 3075F SYST BP GE 130 - 139MM HG: CPT | Performed by: NURSE PRACTITIONER

## 2025-06-23 PROCEDURE — 99214 OFFICE O/P EST MOD 30 MIN: CPT | Performed by: NURSE PRACTITIONER

## 2025-06-23 RX ORDER — METHYLPREDNISOLONE 4 MG/1
TABLET ORAL
Qty: 21 TABLET | Refills: 0 | Status: SHIPPED | OUTPATIENT
Start: 2025-06-23

## 2025-06-23 RX ORDER — FLUTICASONE PROPIONATE 50 MCG
1 SPRAY, SUSPENSION (ML) NASAL DAILY
COMMUNITY
Start: 2025-06-23

## 2025-06-23 ASSESSMENT — ASTHMA QUESTIONNAIRES
QUESTION_3 LAST FOUR WEEKS HOW OFTEN DID YOUR ASTHMA SYMPTOMS (WHEEZING, COUGHING, SHORTNESS OF BREATH, CHEST TIGHTNESS OR PAIN) WAKE YOU UP AT NIGHT OR EARLIER THAN USUAL IN THE MORNING: NOT AT ALL
ACT_TOTALSCORE: 25
QUESTION_4 LAST FOUR WEEKS HOW OFTEN HAVE YOU USED YOUR RESCUE INHALER OR NEBULIZER MEDICATION (SUCH AS ALBUTEROL): NOT AT ALL
QUESTION_2 LAST FOUR WEEKS HOW OFTEN HAVE YOU HAD SHORTNESS OF BREATH: NOT AT ALL
QUESTION_5 LAST FOUR WEEKS HOW WOULD YOU RATE YOUR ASTHMA CONTROL: COMPLETELY CONTROLLED
QUESTION_1 LAST FOUR WEEKS HOW MUCH OF THE TIME DID YOUR ASTHMA KEEP YOU FROM GETTING AS MUCH DONE AT WORK, SCHOOL OR AT HOME: NONE OF THE TIME

## 2025-06-23 ASSESSMENT — ENCOUNTER SYMPTOMS: LEG PAIN: 1

## 2025-06-23 NOTE — PROGRESS NOTES
Assessment & Plan     Acute right-sided low back pain with right-sided sciatica  No acute red flag symptoms.  Will treat with steroid dose tu to see if this will help with radiculopathy symptoms.  Continue to do home back exercises. Follow up with PT if not improving with conservative measures.    - methylPREDNISolone (MEDROL DOSEPAK) 4 MG tablet therapy pack; Follow Package Directions  - Physical Therapy  Referral; Future    Benign paroxysmal positional vertigo of right ear  Symptoms are consistent with BPPV. Discussed otc meclizine if worsening symptoms. Recommend patient follow up if worsening to consider PT referral for Epley maneuver. I have also noted an effusion as below.    OME (otitis media with effusion), right  Flonase to help with effusion. If still persisting could consider follow up with ENT to discuss tube or myringotomy.    - fluticasone (FLONASE) 50 MCG/ACT nasal spray; Spray 1 spray into both nostrils daily.                Subjective   Sukhdev is a 35 year old, presenting for the following health issues:  Leg Pain (Pain from buttocks to thigh/X2 weeks /Has tried stretching, aleve, tylenol, ibuprofen and ice - helps a little)      6/23/2025     2:11 PM   Additional Questions   Roomed by DAGMAR QUIROZ   Accompanied by self     Leg Pain    History of Present Illness       Reason for visit:  Could be sciatica or piriformis,right side buttocks to calf pain,mostly calf pain  Symptom onset:  1-2 weeks ago  Symptoms include:  Calf pain mostly but a little buttocks too on right side  Symptom intensity:  Mild  Symptom progression:  Staying the same  Had these symptoms before:  No  What makes it worse:  At night when im trying to sleep  What makes it better:  Sitting down sometimes,stretching   He is taking medications regularly.      No injury that patient remembers. Patient does work out-doesn't lift super heavy. 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      Denies numbness and  "tingling.     Pain on lateral aspect of calf extending to posterior     Patient also endorses some dizziness . Worse with laying down and turning head to right.                   Objective    /81   Pulse 63   Temp 98.6  F (37  C) (Oral)   Resp 22   Ht 1.727 m (5' 8\")   Wt 89.1 kg (196 lb 6.4 oz)   SpO2 97%   BMI 29.86 kg/m    Body mass index is 29.86 kg/m .  Physical Exam  Constitutional:       Appearance: Normal appearance.   HENT:      Right Ear: A middle ear effusion is present.   Cardiovascular:      Rate and Rhythm: Normal rate and regular rhythm.   Pulmonary:      Effort: Pulmonary effort is normal.      Breath sounds: Normal breath sounds.   Musculoskeletal:      Cervical back: Normal.      Thoracic back: Normal.      Lumbar back: Positive right straight leg raise test.   Neurological:      General: No focal deficit present.      Mental Status: He is alert and oriented to person, place, and time.   Psychiatric:         Mood and Affect: Mood normal.         Behavior: Behavior normal.                    Signed Electronically by: DEBORAH Canseco CNP    "

## 2025-07-05 ENCOUNTER — MYC MEDICAL ADVICE (OUTPATIENT)
Dept: FAMILY MEDICINE | Facility: CLINIC | Age: 35
End: 2025-07-05
Payer: COMMERCIAL

## 2025-07-05 DIAGNOSIS — M54.41 ACUTE RIGHT-SIDED LOW BACK PAIN WITH RIGHT-SIDED SCIATICA: Primary | ICD-10-CM

## 2025-07-14 RX ORDER — LIDOCAINE 50 MG/G
1 PATCH TOPICAL EVERY 24 HOURS
Qty: 30 PATCH | Refills: 0 | Status: SHIPPED | OUTPATIENT
Start: 2025-07-14

## 2025-08-10 DIAGNOSIS — M54.41 ACUTE RIGHT-SIDED LOW BACK PAIN WITH RIGHT-SIDED SCIATICA: ICD-10-CM

## 2025-08-11 RX ORDER — LIDOCAINE 50 MG/G
1 PATCH TOPICAL EVERY 24 HOURS
Qty: 30 PATCH | Refills: 0 | Status: SHIPPED | OUTPATIENT
Start: 2025-08-11